# Patient Record
Sex: MALE | Race: ASIAN | NOT HISPANIC OR LATINO | URBAN - METROPOLITAN AREA
[De-identification: names, ages, dates, MRNs, and addresses within clinical notes are randomized per-mention and may not be internally consistent; named-entity substitution may affect disease eponyms.]

---

## 2023-06-14 VITALS
WEIGHT: 160.06 LBS | HEIGHT: 67 IN | DIASTOLIC BLOOD PRESSURE: 71 MMHG | RESPIRATION RATE: 16 BRPM | SYSTOLIC BLOOD PRESSURE: 139 MMHG | TEMPERATURE: 98 F | HEART RATE: 54 BPM | OXYGEN SATURATION: 96 %

## 2023-06-14 RX ORDER — CHLORHEXIDINE GLUCONATE 213 G/1000ML
1 SOLUTION TOPICAL ONCE
Refills: 0 | Status: DISCONTINUED | OUTPATIENT
Start: 2023-06-19 | End: 2023-06-20

## 2023-06-14 NOTE — H&P ADULT - RESPIRATORY
Bed: 11  Expected date:   Expected time:   Means of arrival:   Comments:  ems     Mason Bo RN  08/03/19 7629 normal/clear to auscultation bilaterally/no wheezes/no rales/no rhonchi

## 2023-06-14 NOTE — H&P ADULT - NSHPLABSRESULTS_GEN_ALL_CORE
15.1   7.14  )-----------( 251      ( 19 Jun 2023 07:09 )             45.3                             EKG: SB @ 53 BPM, incomplete RBBB, no ST/T segment changes

## 2023-06-14 NOTE — H&P ADULT - HISTORY OF PRESENT ILLNESS
Cardio: Dr. Aruna Crow  Pharmacy:  Escort:    57 year old male current smoker with PMHX of HTN, HLD. Pt presented to their outpatient cardiologist Dr. Aruna Crow complaining of SSCP w/ WHITE upon climbing mild incline. Pt currently feels _____.  Denies CP/SOB, dizziness, palpitations, orthopnea/PND, leg swelling, LOC, bleeding, melena/hematochezia, fever, chills, URI symptoms, or recent illness.    Patient underwent ECHO with____, Patient underwent NST/Stress-Echo with  ________       In light of pts risk factors, CCS class III anginal symptoms and abnormal NST, pt now presents to [facility] for recommended cardiac catheterization with possible intervention if clinically indicated.     Cardio: Dr. Aruna Crow  Pharmacy:  Escort:    57 year old male current smoker with PMHX of HTN, HLD, TIA (on aspirin, statin). Pt presented to their outpatient cardiologist Dr. Aruna Crow complaining of SSCP w/ WHITE upon climbing mild incline. Underwent abnormal CCTA (see report below). Pt currently feels _____.  Denies CP/SOB, dizziness, palpitations, orthopnea/PND, leg swelling, LOC, bleeding, melena/hematochezia, fever, chills, URI symptoms, or recent illness.    CCTA 5/1/23: Ca score 103, severe mixed disease in the mLCx, severe mixed disease in the mid RCA, mild-mod mixed disease in the D1, mod mixed in the ramus intermedius, which is a small caliber vessel, remaining coronary artery segments appear non-obstructive.     In light of pts risk factors, CCS class III anginal symptoms and abnormal CCTA, pt now presents to [facility] for recommended cardiac catheterization with possible intervention if clinically indicated.     Cardio: Dr. Aruna Crow  Pharmacy:  Escort:    56 yo M, current smoker (45 pack years), PMHx HTN, HLD, TIA (on aspirin, statin) who presented to their outpatient cardiologist Dr. Aruna Crow complaining of SSCP w/ WHITE upon climbing mild incline. CCTA 5/1/23: Ca score 103, severe mixed disease in the mLCx, severe mixed disease in the mid RCA, mild-mod mixed disease in the D1, mod mixed in the ramus intermedius, which is a small caliber vessel, remaining coronary artery segments appear non-obstructive. Denies f/c, fatigue, syncope. In light of pt's risk factors, CCS class III anginal symptoms, abnormal CCTA, pt presents for recommended cardiac catheterization with possible intervention if clinically indicated.     Cardio: Dr. Aruna Crow  Pharmacy: [address] [place] [address] Pharmacy [id number]  Escort: Family    56 yo M, current smoker (45 pack years), PMHx HTN, HLD, TIA (on aspirin, statin) who presented to their outpatient cardiologist Dr. Aruna Crow complaining of SSCP w/ WHITE upon climbing mild incline. CCTA 5/1/23: Ca score 103, severe mixed disease in the mLCx, severe mixed disease in the mid RCA, mild-mod mixed disease in the D1, mod mixed in the ramus intermedius, which is a small caliber vessel, remaining coronary artery segments appear non-obstructive. Denies f/c, fatigue, syncope. In light of pt's risk factors, CCS class III anginal symptoms, abnormal CCTA, pt presents for recommended cardiac catheterization with possible intervention if clinically indicated.

## 2023-06-14 NOTE — H&P ADULT - SOCIAL HISTORY
Called and talked to patient she gas had from Wednesday sinus drainage with cough denies fever but has chills. Taking Ramona seltzer cold and flu. Unable to drive and currently has no transport. Her daughter just recovered from being ill. She is asking if Dr Horacio Trivedi would send her an antibiotic to the sinus infection. Yes

## 2023-06-16 RX ORDER — LOSARTAN POTASSIUM 100 MG/1
1 TABLET, FILM COATED ORAL
Refills: 0 | DISCHARGE

## 2023-06-19 ENCOUNTER — INPATIENT (INPATIENT)
Facility: HOSPITAL | Age: 58
LOS: 0 days | Discharge: HOME | DRG: 247 | End: 2023-06-20
Attending: INTERNAL MEDICINE | Admitting: INTERNAL MEDICINE
Payer: COMMERCIAL

## 2023-06-19 LAB
A1C WITH ESTIMATED AVERAGE GLUCOSE RESULT: 6.2 % — HIGH (ref 4–5.6)
ALBUMIN SERPL ELPH-MCNC: 4 G/DL — SIGNIFICANT CHANGE UP (ref 3.3–5)
ALP SERPL-CCNC: 76 U/L — SIGNIFICANT CHANGE UP (ref 40–120)
ALT FLD-CCNC: 25 U/L — SIGNIFICANT CHANGE UP (ref 10–45)
ANION GAP SERPL CALC-SCNC: 12 MMOL/L — SIGNIFICANT CHANGE UP (ref 5–17)
APTT BLD: 31.7 SEC — SIGNIFICANT CHANGE UP (ref 27.5–35.5)
AST SERPL-CCNC: 19 U/L — SIGNIFICANT CHANGE UP (ref 10–40)
BASOPHILS # BLD AUTO: 0.08 K/UL — SIGNIFICANT CHANGE UP (ref 0–0.2)
BASOPHILS NFR BLD AUTO: 1.1 % — SIGNIFICANT CHANGE UP (ref 0–2)
BILIRUB SERPL-MCNC: 0.4 MG/DL — SIGNIFICANT CHANGE UP (ref 0.2–1.2)
BLOOD GAS VENOUS - BLOOD UREA NITROGEN: 28 MG/DL — HIGH (ref 7–23)
BLOOD GAS VENOUS - CREATININE: 1.3 MG/DL — SIGNIFICANT CHANGE UP (ref 0.5–1.3)
BUN SERPL-MCNC: 27 MG/DL — HIGH (ref 7–23)
CA-I SERPL-SCNC: 1.24 MMOL/L — SIGNIFICANT CHANGE UP (ref 1.15–1.33)
CALCIUM SERPL-MCNC: 9.2 MG/DL — SIGNIFICANT CHANGE UP (ref 8.4–10.5)
CHLORIDE BLDV-SCNC: 105 MMOL/L — SIGNIFICANT CHANGE UP (ref 96–108)
CHLORIDE SERPL-SCNC: 104 MMOL/L — SIGNIFICANT CHANGE UP (ref 96–108)
CHOLEST SERPL-MCNC: 115 MG/DL — SIGNIFICANT CHANGE UP
CK MB CFR SERPL CALC: 2.1 NG/ML — SIGNIFICANT CHANGE UP (ref 0–6.7)
CK SERPL-CCNC: 149 U/L — SIGNIFICANT CHANGE UP (ref 30–200)
CO2 BLDV-SCNC: 21.8 MMOL/L — LOW (ref 22–26)
CO2 SERPL-SCNC: 23 MMOL/L — SIGNIFICANT CHANGE UP (ref 22–31)
CREAT SERPL-MCNC: 1.09 MG/DL — SIGNIFICANT CHANGE UP (ref 0.5–1.3)
EGFR: 78 ML/MIN/1.73M2 — SIGNIFICANT CHANGE UP
EOSINOPHIL # BLD AUTO: 0.55 K/UL — HIGH (ref 0–0.5)
EOSINOPHIL NFR BLD AUTO: 7.7 % — HIGH (ref 0–6)
ESTIMATED AVERAGE GLUCOSE: 131 MG/DL — HIGH (ref 68–114)
GAS PNL BLDV: 134 MMOL/L — LOW (ref 136–145)
GLUCOSE BLDV-MCNC: 116 MG/DL — HIGH (ref 70–99)
GLUCOSE SERPL-MCNC: 122 MG/DL — HIGH (ref 70–99)
HCT VFR BLD CALC: 45.3 % — SIGNIFICANT CHANGE UP (ref 39–50)
HCT VFR BLDA CALC: 46 % — SIGNIFICANT CHANGE UP
HDLC SERPL-MCNC: 31 MG/DL — LOW
HGB BLD-MCNC: 15.1 G/DL — SIGNIFICANT CHANGE UP (ref 13–17)
IMM GRANULOCYTES NFR BLD AUTO: 0.6 % — SIGNIFICANT CHANGE UP (ref 0–0.9)
INR BLD: 0.93 — SIGNIFICANT CHANGE UP (ref 0.88–1.16)
ISTAT ACTK (ACTIVATED CLOTTING TIME KAOLIN): 317 SEC — HIGH (ref 74–137)
ISTAT INR: 1.1 — SIGNIFICANT CHANGE UP (ref 0.88–1.16)
ISTAT PT: 12.6 SEC — SIGNIFICANT CHANGE UP (ref 10–12.9)
LACTATE BLDV-MCNC: 1.1 MMOL/L — SIGNIFICANT CHANGE UP (ref 0.5–2)
LIPID PNL WITH DIRECT LDL SERPL: 46 MG/DL — SIGNIFICANT CHANGE UP
LYMPHOCYTES # BLD AUTO: 2.58 K/UL — SIGNIFICANT CHANGE UP (ref 1–3.3)
LYMPHOCYTES # BLD AUTO: 36.1 % — SIGNIFICANT CHANGE UP (ref 13–44)
MAGNESIUM SERPL-MCNC: 2 MG/DL — SIGNIFICANT CHANGE UP (ref 1.6–2.6)
MCHC RBC-ENTMCNC: 30.2 PG — SIGNIFICANT CHANGE UP (ref 27–34)
MCHC RBC-ENTMCNC: 33.3 GM/DL — SIGNIFICANT CHANGE UP (ref 32–36)
MCV RBC AUTO: 90.6 FL — SIGNIFICANT CHANGE UP (ref 80–100)
MONOCYTES # BLD AUTO: 0.74 K/UL — SIGNIFICANT CHANGE UP (ref 0–0.9)
MONOCYTES NFR BLD AUTO: 10.4 % — SIGNIFICANT CHANGE UP (ref 2–14)
NEUTROPHILS # BLD AUTO: 3.15 K/UL — SIGNIFICANT CHANGE UP (ref 1.8–7.4)
NEUTROPHILS NFR BLD AUTO: 44.1 % — SIGNIFICANT CHANGE UP (ref 43–77)
NON HDL CHOLESTEROL: 84 MG/DL — SIGNIFICANT CHANGE UP
NRBC # BLD: 0 /100 WBCS — SIGNIFICANT CHANGE UP (ref 0–0)
PCO2 BLDV: 43 MMHG — SIGNIFICANT CHANGE UP (ref 42–55)
PH BLDV: 7.37 — SIGNIFICANT CHANGE UP (ref 7.32–7.43)
PLATELET # BLD AUTO: 251 K/UL — SIGNIFICANT CHANGE UP (ref 150–400)
POTASSIUM BLDV-SCNC: 3.8 MMOL/L — SIGNIFICANT CHANGE UP (ref 3.5–5.1)
POTASSIUM SERPL-MCNC: 3.6 MMOL/L — SIGNIFICANT CHANGE UP (ref 3.5–5.3)
POTASSIUM SERPL-SCNC: 3.6 MMOL/L — SIGNIFICANT CHANGE UP (ref 3.5–5.3)
PROT SERPL-MCNC: 7.3 G/DL — SIGNIFICANT CHANGE UP (ref 6–8.3)
PROTHROM AB SERPL-ACNC: 11.1 SEC — SIGNIFICANT CHANGE UP (ref 10.5–13.4)
RBC # BLD: 5 M/UL — SIGNIFICANT CHANGE UP (ref 4.2–5.8)
RBC # FLD: 13.6 % — SIGNIFICANT CHANGE UP (ref 10.3–14.5)
SODIUM SERPL-SCNC: 139 MMOL/L — SIGNIFICANT CHANGE UP (ref 135–145)
TRIGL SERPL-MCNC: 191 MG/DL — HIGH
WBC # BLD: 7.14 K/UL — SIGNIFICANT CHANGE UP (ref 3.8–10.5)
WBC # FLD AUTO: 7.14 K/UL — SIGNIFICANT CHANGE UP (ref 3.8–10.5)

## 2023-06-19 PROCEDURE — 93010 ELECTROCARDIOGRAM REPORT: CPT

## 2023-06-19 RX ORDER — ATORVASTATIN CALCIUM 80 MG/1
80 TABLET, FILM COATED ORAL AT BEDTIME
Refills: 0 | Status: DISCONTINUED | OUTPATIENT
Start: 2023-06-19 | End: 2023-06-20

## 2023-06-19 RX ORDER — SODIUM CHLORIDE 9 MG/ML
500 INJECTION INTRAMUSCULAR; INTRAVENOUS; SUBCUTANEOUS ONCE
Refills: 0 | Status: COMPLETED | OUTPATIENT
Start: 2023-06-19 | End: 2023-06-19

## 2023-06-19 RX ORDER — ASPIRIN/CALCIUM CARB/MAGNESIUM 324 MG
81 TABLET ORAL DAILY
Refills: 0 | Status: DISCONTINUED | OUTPATIENT
Start: 2023-06-20 | End: 2023-06-20

## 2023-06-19 RX ORDER — CLOPIDOGREL BISULFATE 75 MG/1
600 TABLET, FILM COATED ORAL ONCE
Refills: 0 | Status: COMPLETED | OUTPATIENT
Start: 2023-06-19 | End: 2023-06-19

## 2023-06-19 RX ORDER — POTASSIUM CHLORIDE 20 MEQ
40 PACKET (EA) ORAL ONCE
Refills: 0 | Status: COMPLETED | OUTPATIENT
Start: 2023-06-19 | End: 2023-06-19

## 2023-06-19 RX ORDER — SODIUM CHLORIDE 9 MG/ML
500 INJECTION INTRAMUSCULAR; INTRAVENOUS; SUBCUTANEOUS
Refills: 0 | Status: DISCONTINUED | OUTPATIENT
Start: 2023-06-19 | End: 2023-06-20

## 2023-06-19 RX ORDER — CLOPIDOGREL BISULFATE 75 MG/1
75 TABLET, FILM COATED ORAL DAILY
Refills: 0 | Status: DISCONTINUED | OUTPATIENT
Start: 2023-06-20 | End: 2023-06-20

## 2023-06-19 RX ORDER — AMLODIPINE BESYLATE 2.5 MG/1
5 TABLET ORAL DAILY
Refills: 0 | Status: DISCONTINUED | OUTPATIENT
Start: 2023-06-20 | End: 2023-06-20

## 2023-06-19 RX ORDER — LOSARTAN POTASSIUM 100 MG/1
100 TABLET, FILM COATED ORAL DAILY
Refills: 0 | Status: DISCONTINUED | OUTPATIENT
Start: 2023-06-20 | End: 2023-06-20

## 2023-06-19 RX ORDER — SODIUM CHLORIDE 9 MG/ML
500 INJECTION INTRAMUSCULAR; INTRAVENOUS; SUBCUTANEOUS
Refills: 0 | Status: DISCONTINUED | OUTPATIENT
Start: 2023-06-19 | End: 2023-06-19

## 2023-06-19 RX ORDER — METOPROLOL TARTRATE 50 MG
1 TABLET ORAL
Refills: 0 | DISCHARGE

## 2023-06-19 RX ADMIN — ATORVASTATIN CALCIUM 80 MILLIGRAM(S): 80 TABLET, FILM COATED ORAL at 21:39

## 2023-06-19 RX ADMIN — SODIUM CHLORIDE 215 MILLILITER(S): 9 INJECTION INTRAMUSCULAR; INTRAVENOUS; SUBCUTANEOUS at 10:21

## 2023-06-19 RX ADMIN — SODIUM CHLORIDE 1000 MILLILITER(S): 9 INJECTION INTRAMUSCULAR; INTRAVENOUS; SUBCUTANEOUS at 08:18

## 2023-06-19 RX ADMIN — CLOPIDOGREL BISULFATE 600 MILLIGRAM(S): 75 TABLET, FILM COATED ORAL at 08:20

## 2023-06-19 RX ADMIN — Medication 40 MILLIEQUIVALENT(S): at 08:27

## 2023-06-19 RX ADMIN — SODIUM CHLORIDE 75 MILLILITER(S): 9 INJECTION INTRAMUSCULAR; INTRAVENOUS; SUBCUTANEOUS at 08:25

## 2023-06-19 NOTE — PATIENT PROFILE ADULT - SURGICAL SITE DRAIN
----- Message from Tarsha Ibarra sent at 12/24/2019  7:47 AM CST -----  Contact: pt  Can the clinic reply in [facility]: no    Please refill the medication(s) listed below. The patient can be reached at this phone number [phone]) once it is called into the pharmacy.    Medication #1oxyCODONE (ROXICODONE) 15 MG Tab    Preferred Pharmacy:[facility] - [address]   no

## 2023-06-19 NOTE — PATIENT PROFILE ADULT - FALL HARM RISK - HARM RISK INTERVENTIONS

## 2023-06-19 NOTE — PATIENT PROFILE ADULT - FALL HARM RISK - FACTORS
Spoke to patient about applying vashe with gauze twice per day. Patient requested more supplies of caramax pads and incontinence pads     Request more caramax pads and incontinence pads on thursday     Post procedure

## 2023-06-19 NOTE — PATIENT PROFILE ADULT - FUNCTIONAL ASSESSMENT - BASIC MOBILITY SCORE.
PAST SURGICAL HISTORY:  H/O hernia repair     S/P appendectomy 12/26/2020    S/P laser trabeculoplasty of eye      24

## 2023-06-20 ENCOUNTER — TRANSCRIPTION ENCOUNTER (OUTPATIENT)
Age: 58
End: 2023-06-20

## 2023-06-20 VITALS — TEMPERATURE: 97 F

## 2023-06-20 LAB
ANION GAP SERPL CALC-SCNC: 13 MMOL/L — SIGNIFICANT CHANGE UP (ref 5–17)
BUN SERPL-MCNC: 22 MG/DL — SIGNIFICANT CHANGE UP (ref 7–23)
CALCIUM SERPL-MCNC: 8.8 MG/DL — SIGNIFICANT CHANGE UP (ref 8.4–10.5)
CHLORIDE SERPL-SCNC: 106 MMOL/L — SIGNIFICANT CHANGE UP (ref 96–108)
CO2 SERPL-SCNC: 20 MMOL/L — LOW (ref 22–31)
CREAT SERPL-MCNC: 1.15 MG/DL — SIGNIFICANT CHANGE UP (ref 0.5–1.3)
EGFR: 73 ML/MIN/1.73M2 — SIGNIFICANT CHANGE UP
GLUCOSE SERPL-MCNC: 123 MG/DL — HIGH (ref 70–99)
HCT VFR BLD CALC: 47.5 % — SIGNIFICANT CHANGE UP (ref 39–50)
HCV AB S/CO SERPL IA: 0.13 S/CO — SIGNIFICANT CHANGE UP
HCV AB SERPL-IMP: SIGNIFICANT CHANGE UP
HGB BLD-MCNC: 15.7 G/DL — SIGNIFICANT CHANGE UP (ref 13–17)
MAGNESIUM SERPL-MCNC: 2.1 MG/DL — SIGNIFICANT CHANGE UP (ref 1.6–2.6)
MCHC RBC-ENTMCNC: 30.3 PG — SIGNIFICANT CHANGE UP (ref 27–34)
MCHC RBC-ENTMCNC: 33.1 GM/DL — SIGNIFICANT CHANGE UP (ref 32–36)
MCV RBC AUTO: 91.7 FL — SIGNIFICANT CHANGE UP (ref 80–100)
NRBC # BLD: 0 /100 WBCS — SIGNIFICANT CHANGE UP (ref 0–0)
PLATELET # BLD AUTO: 232 K/UL — SIGNIFICANT CHANGE UP (ref 150–400)
POTASSIUM SERPL-MCNC: 3.7 MMOL/L — SIGNIFICANT CHANGE UP (ref 3.5–5.3)
POTASSIUM SERPL-SCNC: 3.7 MMOL/L — SIGNIFICANT CHANGE UP (ref 3.5–5.3)
RBC # BLD: 5.18 M/UL — SIGNIFICANT CHANGE UP (ref 4.2–5.8)
RBC # FLD: 13.8 % — SIGNIFICANT CHANGE UP (ref 10.3–14.5)
SODIUM SERPL-SCNC: 139 MMOL/L — SIGNIFICANT CHANGE UP (ref 135–145)
WBC # BLD: 6.88 K/UL — SIGNIFICANT CHANGE UP (ref 3.8–10.5)
WBC # FLD AUTO: 6.88 K/UL — SIGNIFICANT CHANGE UP (ref 3.8–10.5)

## 2023-06-20 PROCEDURE — 82550 ASSAY OF CK (CPK): CPT

## 2023-06-20 PROCEDURE — 83036 HEMOGLOBIN GLYCOSYLATED A1C: CPT

## 2023-06-20 PROCEDURE — 99239 HOSP IP/OBS DSCHRG MGMT >30: CPT

## 2023-06-20 PROCEDURE — 85730 THROMBOPLASTIN TIME PARTIAL: CPT

## 2023-06-20 PROCEDURE — 36415 COLL VENOUS BLD VENIPUNCTURE: CPT

## 2023-06-20 PROCEDURE — C1769: CPT

## 2023-06-20 PROCEDURE — C1887: CPT

## 2023-06-20 PROCEDURE — 85610 PROTHROMBIN TIME: CPT

## 2023-06-20 PROCEDURE — 93010 ELECTROCARDIOGRAM REPORT: CPT

## 2023-06-20 PROCEDURE — 93005 ELECTROCARDIOGRAM TRACING: CPT

## 2023-06-20 PROCEDURE — 80053 COMPREHEN METABOLIC PANEL: CPT

## 2023-06-20 PROCEDURE — 86803 HEPATITIS C AB TEST: CPT

## 2023-06-20 PROCEDURE — C1725: CPT

## 2023-06-20 PROCEDURE — 85347 COAGULATION TIME ACTIVATED: CPT

## 2023-06-20 PROCEDURE — 80061 LIPID PANEL: CPT

## 2023-06-20 PROCEDURE — 80048 BASIC METABOLIC PNL TOTAL CA: CPT

## 2023-06-20 PROCEDURE — C1894: CPT

## 2023-06-20 PROCEDURE — 83735 ASSAY OF MAGNESIUM: CPT

## 2023-06-20 PROCEDURE — 85027 COMPLETE CBC AUTOMATED: CPT

## 2023-06-20 PROCEDURE — 85025 COMPLETE CBC W/AUTO DIFF WBC: CPT

## 2023-06-20 PROCEDURE — 82553 CREATINE MB FRACTION: CPT

## 2023-06-20 PROCEDURE — C1874: CPT

## 2023-06-20 RX ORDER — ATORVASTATIN CALCIUM 80 MG/1
1 TABLET, FILM COATED ORAL
Qty: 30 | Refills: 3
Start: 2023-06-20 | End: 2023-10-17

## 2023-06-20 RX ORDER — ATORVASTATIN CALCIUM 80 MG/1
1 TABLET, FILM COATED ORAL
Refills: 0 | DISCHARGE

## 2023-06-20 RX ORDER — ASPIRIN/CALCIUM CARB/MAGNESIUM 324 MG
1 TABLET ORAL
Refills: 0 | DISCHARGE

## 2023-06-20 RX ORDER — CLOPIDOGREL BISULFATE 75 MG/1
1 TABLET, FILM COATED ORAL
Qty: 30 | Refills: 11
Start: 2023-06-20 | End: 2024-06-13

## 2023-06-20 RX ORDER — ASPIRIN/CALCIUM CARB/MAGNESIUM 324 MG
1 TABLET ORAL
Qty: 30 | Refills: 11
Start: 2023-06-20 | End: 2024-06-13

## 2023-06-20 RX ORDER — POTASSIUM CHLORIDE 20 MEQ
40 PACKET (EA) ORAL ONCE
Refills: 0 | Status: COMPLETED | OUTPATIENT
Start: 2023-06-20 | End: 2023-06-20

## 2023-06-20 RX ADMIN — Medication 81 MILLIGRAM(S): at 10:27

## 2023-06-20 RX ADMIN — Medication 40 MILLIEQUIVALENT(S): at 10:25

## 2023-06-20 RX ADMIN — CLOPIDOGREL BISULFATE 75 MILLIGRAM(S): 75 TABLET, FILM COATED ORAL at 10:27

## 2023-06-20 RX ADMIN — AMLODIPINE BESYLATE 5 MILLIGRAM(S): 2.5 TABLET ORAL at 05:19

## 2023-06-20 NOTE — DISCHARGE NOTE NURSING/CASE MANAGEMENT/SOCIAL WORK - PATIENT PORTAL LINK FT
You can access the FollowMyHealth Patient Portal offered by [facility] by registering at the following website: [email or web address]. By joining [facility]’s FollowMyHealth portal, you will also be able to view your health information using other applications (apps) compatible with our system.

## 2023-06-20 NOTE — DISCHARGE NOTE PROVIDER - NSDCCPCAREPLAN_GEN_ALL_CORE_FT
PRINCIPAL DISCHARGE DIAGNOSIS  Diagnosis: CAD (coronary artery disease)  Assessment and Plan of Treatment: -You underwent a cardiac catheterization on 6/19/2023 and the blockage in your RIGHT CORONARY ARTERY was opened with stent placement. You are to take ASPIRIN 81 mg daily and PLAVIX (CLOPIDOGREL) 75 mg daily. These medications work to keep your stents open.  NEVER MISS A DOSE OF ASPIRIN OR PLAVIX; IF YOU DO, YOU ARE AT RISK OF YOUR STENT CLOSING AND HAVING A HEART ATTACK. DO NOT STOP THESE TWO MEDICATIONS UNLESS INSTRUCTED TO DO SO BY YOUR CARDIOLOGIST.  Your procedure was done through your wrist. You do not need to keep this area covered and you may shower. Please avoid any heavy lifting  (no more than 3 to 5 lbs) or strenuous activity for five days. If you develop any swelling, bleeding, hardening of the skin (hematoma formation), acute pain, numbness/tingling  in your armplease contact your doctor immediately or call our 24/7 line: [phone] Please return to the hospital/seek immediate medical attention if worsening of symptoms- including not limited to chest pain, shortness of breath. Please follow up with Dr. Shirley in 1-2 weeks. Please call his office and make an appointment to see him. Please continue a heart healthy diet low in sodium, cholesterol, and fat.  We have provided you with a prescription for cardiac rehab which is medically supervised exercise program for your heart and has been shown to improve the quantity and quality of life of people with heart disease like yours. You should attend cardiac rehab 3 times per week for 12 weeks. We have provided you with a list of nearby facilities. Please call your insurance carrier to determine which of these facilities are covered under your plan.        SECONDARY DISCHARGE DIAGNOSES  Diagnosis: Hyperlipidemia  Assessment and Plan of Treatment: Too much cholesterol in your arteries may lead to a buildup of plaque known as atherosclerosis and contribute to heart disease. Please continue:ATORVASTATIN (LIPITOR) 80 mg daily.   Appropriate refills were sent to your preferred pharmacy. Please follow-up with your cardiologist for further management.      Diagnosis: Hypertension  Assessment and Plan of Treatment: You have a diagnosis of Hypertension or elevated blood pressure. Please continue taking your medications as listed to keep your blood pressure controlled. In addition, there are multiple lifestyle modifications that have been proven to lower blood pressure: maintaining a healthy body weight, engaging in regular physical activity for at least 30 minutes per day on most days, and consuming a diet rich in fruits, vegetables, and low-fat dairy products with a reduced amount of total and saturated fats and sodium. Please continue your COZAAR 100 mg daily, your AMLODIPINE 5 mg daily, and your hydrochlorothiazide 25 mg daily.   For blood pressures at home that are too high or low please see your Doctor or go to the Emergency Room as necessary.

## 2023-06-20 NOTE — DISCHARGE NOTE PROVIDER - NSDCFUADDINST_GEN_ALL_CORE_FT
ACTIVITY:     Do not drive or operate hazardous machinery for 24 hours.                        Limit your physical activities for 24 hours.                        Do not engage in in sports, heavy work or heavy lifting for 72 hours.    DIET:             You may resume your regular diet.                        Drinking extra fluids (water, juice) is encouraged.                        Abstain from alcohol for 24 hours.    HYGIENE:     Shower and take off the puncture site dressing the next morning.                        If you received a "closure device" in your groin, you may shower the next day, but not take a bath, hot tub or swim for 5    days.    PAIN MEDICATION:   A mild pain at the puncture siteis not unusual.                                        You may take Tylenol 1-2 tabs every 4-6 hours as needed, for one day.                                        If the pain persists contact the office at [phone]    SPECIAL INSTRUCTIONS:  Signs and symptoms to look out for:       1. Shaka bleeding from the puncture site is an emergency.            Put direct pressure on the site and go directly to your local Emergency   Room for treatment.       2. Bleeding under the skin may also occur and a small "black and blue may be expected.         If there appears to be an expanding mass or swelling around the puncture site, apply manual compression and go          immediately to your local Emergency Room for treatment.       3. If your foot/leg or hand/arm (puncture site) becomes cool or blue and/or you are unable to move it, this must be treated as an   emergency.         go directly to your local emergency room for treatment.       4. Excessive puncture site pain is abnormal and should be assessed.      5.  Look out for signs of infection in the puncture site: fever, red streaking of the leg, discharge, pain.      6.  Lack of adequate urine output, provided you are drinking enough fluids, may be cause for concern, notify us if you think this is the case.

## 2023-06-20 NOTE — DISCHARGE NOTE PROVIDER - HOSPITAL COURSE
58 yo M, current smoker (45 pack years), PMHx HTN, HLD, TIA (on aspirin, statin) who presented to their outpatient cardiologist Dr. Aruna Crow complaining of SSCP w/ WHITE upon climbing mild incline. CCTA 5/1/23: Ca score 103, severe mixed disease in the mLCx, severe mixed disease in the mid RCA, mild-mod mixed disease in the D1, mod mixed in the ramus intermedius, which is a small caliber vessel, remaining coronary artery segments appear non-obstructive. Denies f/c, fatigue, syncope. In light of pt's risk factors, CCS class III anginal symptoms, abnormal CCTA, pt presents for recommended cardiac catheterization with possible intervention if clinically indicated.  Patient is s/p cardiac cath 6/20/23: PTCA/KRUPA x 1 (Synergy 32 x 3 mm) mid RCA, and other findings LM normal, mid LAD 30%, D1 50-60%, LCx/OM1 bifurcation 80%, proximal RCA 30%, distal RCA 30%, EDP 12 mmHg. Right radial Vasc x 2 at 11:30.  D/C meds: Aspirin 81 mg daily, Plavix 75 mg daily, Atorvastatin 80 mg daily, Losartan 100 mg daily, Hydrochlorothiazide 25 mg daily, and amlodipine 5 mg daily   No significant events on telemetry overnight. Repeat EKG without ischemic changes. Patient has been medically cleared for discharge as per Dr. Aviles. Patient has been given appropriate discharge instructions including medication regimen, access site management and follow up. Medications that patient needs refills on have been e-prescribed to preferred pharmacy.     Cardiac Rehab (Post PCI):            *Education on benefits of Cardiac Rehab provided to patient: Yes         *Referral and Prescription Given for Cardiac Rehab : Yes         *Pt given list of locations & instructed to contact their insurance company to review list of participating providers

## 2023-06-20 NOTE — DISCHARGE NOTE PROVIDER - CARE PROVIDER_API CALL
Kenneth Shirley  Cardiology  [address], [address] [place] [address]  Phone: [phone]  Fax: [phone]  Follow Up Time: 1 week

## 2023-06-20 NOTE — DISCHARGE NOTE PROVIDER - NSDCMRMEDTOKEN_GEN_ALL_CORE_FT
amLODIPine 5 mg oral tablet: 1 orally once a day  Aspirin Enteric Coated 81 mg oral delayed release tablet: 1 tab(s) orally once a day  atorvastatin 80 mg oral tablet: 1 tab(s) orally once a day (at bedtime)  Cardiac Rehab: Cardiac Rehab 3 x week for 12 weeks for post PCI; Please follow-up with Dr. Shirley  clopidogrel 75 mg oral tablet: 1 tab(s) orally once a day  Cozaar 100 mg oral tablet: 1 orally once a day  hydroCHLOROthiazide 25 mg oral tablet: 1 orally once a day  Ilumya 100 mg/mL subcutaneous solution: 1 dose(s) subcutaneously every 12 weeks

## 2023-06-23 DIAGNOSIS — I10 ESSENTIAL (PRIMARY) HYPERTENSION: ICD-10-CM

## 2023-06-23 DIAGNOSIS — E78.5 HYPERLIPIDEMIA, UNSPECIFIED: ICD-10-CM

## 2023-06-23 DIAGNOSIS — I25.119 ATHEROSCLEROTIC HEART DISEASE OF NATIVE CORONARY ARTERY WITH UNSPECIFIED ANGINA PECTORIS: ICD-10-CM

## 2023-06-23 DIAGNOSIS — Z86.73 PERSONAL HISTORY OF TRANSIENT ISCHEMIC ATTACK (TIA), AND CEREBRAL INFARCTION WITHOUT RESIDUAL DEFICITS: ICD-10-CM

## 2023-06-23 DIAGNOSIS — Z79.82 LONG TERM (CURRENT) USE OF ASPIRIN: ICD-10-CM

## 2023-06-23 DIAGNOSIS — F17.210 NICOTINE DEPENDENCE, CIGARETTES, UNCOMPLICATED: ICD-10-CM

## 2023-07-27 VITALS
RESPIRATION RATE: 16 BRPM | TEMPERATURE: 97 F | WEIGHT: 160.06 LBS | OXYGEN SATURATION: 97 % | HEIGHT: 67 IN | HEART RATE: 52 BPM | DIASTOLIC BLOOD PRESSURE: 80 MMHG | SYSTOLIC BLOOD PRESSURE: 136 MMHG

## 2023-07-27 PROBLEM — I10 ESSENTIAL (PRIMARY) HYPERTENSION: Chronic | Status: ACTIVE | Noted: 2023-06-14

## 2023-07-27 PROBLEM — E78.5 HYPERLIPIDEMIA, UNSPECIFIED: Chronic | Status: ACTIVE | Noted: 2023-06-14

## 2023-07-27 RX ORDER — CHLORHEXIDINE GLUCONATE 213 G/1000ML
1 SOLUTION TOPICAL ONCE
Refills: 0 | Status: DISCONTINUED | OUTPATIENT
Start: 2023-07-31 | End: 2023-08-01

## 2023-07-27 NOTE — H&P ADULT - ASSESSMENT
57-year-old male, current smoker (45 pack years), PMHx HTN, HLD, CAD (s/p recent PCI 6/20/23: PTCA/KRUPA x1 mRCA; LCx/OM1 bifurcation 80%), TIA (on aspirin, statin) who is referred to [facility] for staged PCI of the LCx/OM1 in light of patient's risk factors, continued CCS III anginal-equivalent symptoms and known residual LCx/OM1 disease.    ASA II				Mallampati class: II	            Anginal Class: III    - VS s/f HR 52 bpm; patient is asymptomatic, VS otherwise stable   - H/H stable, patient denies BRBPR, melena, hematuria, or further bleeding.   -  Sedation Plan: Moderate   - Patient Is Suitable Candidate For Sedation? Yes  - Cath Order Entered: Yes  - DAPT LOAD: Patient takes ASA 81 mg and Plavix 75 mg daily and endorses good adherence, denies missed doses in last week. Ordered ASA 81 mg PO x1 and Plavix 75 mg PO x1.   - PRE-CATH FLUIDS: Yes; patient is euvolemic on exam; EF 55-60% (April 2023); Cr 1.7. Dr. Shirley aware and agrees with plan for pre-cath IV fluid hydration protocol and to proceed with cath. Ordered IV  cc bolus x 1 over 30 minutes followed by IV NS 75 cc/hr x 2 hours.   - ALLERGY: Patient notes crab allergy (tongue pruritis); ordered Solucortef 200 mg IVP x1 and Benadryl 50 mg IVP x1 on call to the cath lab.   - Informed consent is in the patient's chart. Consent obtained via Language Line Video  Ana ID #[id number].     Risks Benefits Annotation:     CARDIAC CATH: Risks & benefits of procedure and sedation and risks and benefits for the alternative therapy have been explained to the patient and/or HCP in layman’s terms including but not limited to: allergic reaction, bleeding, infection, arrhythmia, respiratory compromise, renal and vascular compromise, limb damage, MI, CVA, emergent CABG/Vascular Surgery and death. Informed consent obtained and in chart.   57-year-old male, current smoker (45 pack years), PMHx HTN, HLD, CAD (s/p recent PCI 6/20/23: PTCA/KRUPA x1 mRCA; LCx/OM1 bifurcation 80%), TIA (on aspirin, statin) who is referred to [facility] for staged PCI of the LCx/OM1 in light of patient's risk factors, continued CCS III anginal-equivalent symptoms and known residual LCx/OM1 disease.    ASA II				Mallampati class: II	            Anginal Class: III    - VS s/f HR 52 bpm; patient is asymptomatic, VS otherwise stable   - H/H stable, patient denies BRBPR, melena, hematuria, or further bleeding.   -  Sedation Plan: Moderate   - Patient Is Suitable Candidate For Sedation? Yes  - Cath Order Entered: Yes  - DAPT LOAD: Patient takes ASA 81 mg and Plavix 75 mg daily and endorses good adherence, denies missed doses in last week. Ordered ASA 81 mg PO x1 and Plavix 75 mg PO x1.   - PRE-CATH FLUIDS: Yes; patient is euvolemic on exam; EF 55-60% (April 2023); Cr 1.7. Dr. Shirley aware and agrees with plan for pre-cath IV fluid hydration protocol and to proceed with cath. Ordered IV  cc bolus x 1 over 30 minutes followed by IV NS 75 cc/hr x 2 hours.   - ALLERGY: Patient notes crab allergy (tongue pruritis); ordered Solucortef 200 mg IVP x1 and Benadryl 50 mg IVP x1 on call to the cath lab.   - Informed consent is in the patient's chart. Consent obtained via Language Line Video  Ana Stahl) ID #[id number].     Risks Benefits Annotation:     CARDIAC CATH: Risks & benefits of procedure and sedation and risks and benefits for the alternative therapy have been explained to the patient and/or HCP in layman’s terms including but not limited to: allergic reaction, bleeding, infection, arrhythmia, respiratory compromise, renal and vascular compromise, limb damage, MI, CVA, emergent CABG/Vascular Surgery and death. Informed consent obtained and in chart.   57-year-old male, current smoker (45 pack years), PMHx HTN, HLD, CAD (s/p recent PCI 6/20/23: PTCA/KRUPA x1 mRCA; LCx/OM1 bifurcation 80%), TIA (on aspirin, statin) who is referred to [facility] for staged PCI of the LCx/OM1 in light of patient's risk factors, continued CCS III anginal-equivalent symptoms and known residual LCx/OM1 disease.    ASA II				Mallampati class: II	            Anginal Class: III    - VS s/f HR 52 bpm; patient is asymptomatic, VS otherwise stable   - H/H stable, patient denies BRBPR, melena, hematuria, or further bleeding.   -  Sedation Plan: Moderate   - Patient Is Suitable Candidate For Sedation? Yes  - Cath Order Entered: Yes  - DAPT LOAD: Patient takes ASA 81 mg and Plavix 75 mg daily and endorses good adherence, denies missed doses in last week. Ordered ASA 81 mg PO x1 and Plavix 75 mg PO x1.   - PRE-CATH FLUIDS: Yes; patient is euvolemic on exam; EF 55-60% (April 2023); Cr 1.7. Repeat labs  showed Cr still 1.7.  Has been started on IVF.  Will hold cath for now and obtain renal consult at bedside.  - ALLERGY: Patient notes crab allergy (tongue pruritis); ordered Solucortef 200 mg IVP x1 and Benadryl 50 mg IVP x1 on call to the cath lab.   - Informed consent is in the patient's chart. Consent obtained via Language Line Video  Ana Stahl) ID #[id number].     Risks Benefits Annotation:     CARDIAC CATH: Risks & benefits of procedure and sedation and risks and benefits for the alternative therapy have been explained to the patient and/or HCP in layman’s terms including but not limited to: allergic reaction, bleeding, infection, arrhythmia, respiratory compromise, renal and vascular compromise, limb damage, MI, CVA, emergent CABG/Vascular Surgery and death. Informed consent obtained and in chart.   57-year-old male, current smoker (45 pack years), PMHx HTN, HLD, CAD (s/p recent PCI 6/20/23: PTCA/KRUPA x1 mRCA; LCx/OM1 bifurcation 80%), TIA (on aspirin, statin) who is referred to [facility] for staged PCI of the LCx/OM1 in light of patient's risk factors, continued CCS III anginal-equivalent symptoms and known residual LCx/OM1 disease.    ASA II				Mallampati class: II	            Anginal Class: III    - VS s/f HR 52 bpm; patient is asymptomatic, VS otherwise stable   - H/H stable, patient denies BRBPR, melena, hematuria, or further bleeding.   -  Sedation Plan: Moderate   - Patient Is Suitable Candidate For Sedation? Yes  - Cath Order Entered: Yes  - DAPT LOAD: Patient takes ASA 81 mg and Plavix 75 mg daily and endorses good adherence, denies missed doses in last week. Ordered ASA 81 mg PO x1 and Plavix 75 mg PO x1.   - PRE-CATH FLUIDS: Yes; patient is euvolemic on exam; EF 55-60% (April 2023); Cr 1.7. Repeat labs  showed Cr still 1.7.  Has been started on IVF.  Held cath and obtained renal consult. Nephrology states that patient is cleared to proceed with cath from a renal standpoint. Dr. Shirley (interventional cardiologist) made aware.   - ALLERGY: Patient notes crab allergy (tongue pruritis); ordered Solucortef 200 mg IVP x1 and Benadryl 50 mg IVP x1 on call to the cath lab.   - Informed consent is in the patient's chart. Consent obtained via Language Line Video  Ana Stahl) ID #[id number].     Risks Benefits Annotation:     CARDIAC CATH: Risks & benefits of procedure and sedation and risks and benefits for the alternative therapy have been explained to the patient and/or HCP in layman’s terms including but not limited to: allergic reaction, bleeding, infection, arrhythmia, respiratory compromise, renal and vascular compromise, limb damage, MI, CVA, emergent CABG/Vascular Surgery and death. Informed consent obtained and in chart.

## 2023-07-27 NOTE — H&P ADULT - NSHPLABSRESULTS_GEN_ALL_CORE
LABS:                          15.4   8.09  )-----------( 227      ( 31 Jul 2023 06:54 )             46.8       Mg     1.9     07-31        PT/INR - ( 31 Jul 2023 06:54 )   PT: 10.3 sec;   INR: 0.90          PTT - ( 31 Jul 2023 06:54 )  PTT:34.2 sec LABS:                          15.4   8.09  )-----------( 227      ( 31 Jul 2023 06:54 )             46.8     07-31    142  |  102  |  29<H>  ----------------------------<  106<H>  4.0   |  29  |  1.71<H>    Ca    9.8      31 Jul 2023 06:54  Mg     1.9     07-31    TPro  8.0  /  Alb  4.4  /  TBili  0.4  /  DBili  x   /  AST  19  /  ALT  23  /  AlkPhos  67  07-31    LIVER FUNCTIONS - ( 31 Jul 2023 06:54 )  Alb: 4.4 g/dL / Pro: 8.0 g/dL / ALK PHOS: 67 U/L / ALT: 23 U/L / AST: 19 U/L / GGT: x           PT/INR - ( 31 Jul 2023 06:54 )   PT: 10.3 sec;   INR: 0.90          PTT - ( 31 Jul 2023 06:54 )  PTT:34.2 sec  Urinalysis Basic - ( 31 Jul 2023 06:54 )    Color: x / Appearance: x / SG: x / pH: x  Gluc: 106 mg/dL / Ketone: x  / Bili: x / Urobili: x   Blood: x / Protein: x / Nitrite: x   Leuk Esterase: x / RBC: x / WBC x   Sq Epi: x / Non Sq Epi: x / Bacteria: x      EKG 7/31/23: sinus rhythm, 50 bpm,  ms, no acute changes.

## 2023-07-27 NOTE — H&P ADULT - HISTORY OF PRESENT ILLNESS
Cardio: Dr. Aruna Crow  Pharmacy: [address] [place] [address] Pharmacy [id number]  Escort: ________    56 yo M, current smoker (45 pack years), PMHx HTN, HLD, CAD (s/p recent PCI 6/20/23: PTCA/KRUPA x1 mRCA; LM normal, mid LAD 30%, D1 50-60%, LCx/OM1 bifurcation 80%, proximal RCA 30%, distal RCA 30%), TIA (on aspirin, statin) who originally presented to their outpatient cardiologist Dr. Aruna Crow complaining of SSCP w/ WHITE upon climbing mild incline. CCTA 5/1/23: Ca score 103, severe mixed disease in the mLCx, severe mixed disease in the mid RCA, mild-mod mixed disease in the D1, mod mixed in the ramus intermedius, which is a small caliber vessel, remaining coronary artery segments appear non-obstructive. Denies f/c, fatigue, syncope. In light of pt's risk factors, CCS class III anginal symptoms, abnormal CCTA, pt presents for recommended cardiac catheterization with possible intervention if clinically indicated. Patient underwent [facility] w/ PCI as above, with residual disease in LCx/OM1 bifurcation with plan for staging. Since procedure, patient reports feeling much better, with drastic improvement in his substernal chest pain, but does continue to experience mild SOB with stairs. Patient otherwise denies _________ dizziness, palpitations, orthopnea/PND, leg swelling, LOC, bleeding, melena/hematochezia, fever, chills, URI symptoms, or recent illness.  In light of pts risk factors, continued CCS III anginal-equivalent symptoms and known residual LCx/OM1 disease, pt now presents to [facility] for staged PCI.  Cardio: Dr. Aruna Crow  Pharmacy: [address] [place] [address] Pharmacy [id number]  Escort: ________    57-year-old male, current smoker (45 pack years), PMHx HTN, HLD, CAD (s/p recent PCI 6/20/23: PTCA/KRUPA x1 mRCA; LCx/OM1 bifurcation 80%), TIA (on aspirin, statin) who originally presented to their outpatient cardiologist Dr. Aruna Crow complaining of SSCP w/ WHITE upon climbing mild incline. Patient underwent [facility] w/ PCI on 6/20/23 with residual disease in LCx/OM1 bifurcation with plan for staging. Since procedure, patient reports feeling much better, with drastic improvement in his substernal chest pain, but does continue to experience mild SOB with stairs and _____ endorses strict adherence to DAPT therapy as prescribed. Patient otherwise denies _________ dizziness, palpitations, orthopnea/PND, leg swelling, LOC, bleeding, melena/hematochezia, fever, chills, URI symptoms, or recent illness.  In light of patient's risk factors, continued CCS III anginal-equivalent symptoms and known residual LCx/OM1 disease, pt now presents to [facility] for staged PCI.     Cardiac catheterization (6/20/23): PTCA/KRUPA x 1 (Synergy 32 x 3 mm) mid RCA, and other findings LM normal, mid LAD 30%, D1 50-60%, LCx/OM1 bifurcation 80%, proximal RCA 30%, distal RCA 30%, EDP 12 mmHg. Right radial access.     CCTA (5/1/23): Ca score 103, severe mixed disease in the mLCx, severe mixed disease in the mid RCA, mild-mod mixed disease in the D1, mod mixed in the ramus intermedius, which is a small caliber vessel, remaining coronary artery segments appear non-obstructive. Cardio: Dr. Aruna Crow  Pharmacy: [address] [place] [address] Pharmacy [id number]  Escort: wife    57-year-old male, current smoker (45 pack years), PMHx HTN, HLD, CAD (s/p recent PCI 6/20/23: PTCA/KRUPA x1 mRCA; LCx/OM1 bifurcation 80%), TIA (on aspirin, statin) who originally presented to their outpatient cardiologist Dr. Aruna Crow complaining of SSCP w/ WHITE upon climbing mild incline. Patient underwent [facility] w/ PCI on 6/20/23 with residual disease in LCx/OM1 bifurcation with plan for staging. Since procedure, patient reports feeling much better, with drastic improvement in his substernal chest pain, but does continue to experience mild SOB with stairs and endorses strict adherence to DAPT therapy as prescribed. Patient denies: dizziness, palpitations, orthopnea/PND, leg swelling, LOC, bleeding, melena/hematochezia, fever, chills, URI symptoms, or recent illness.  In light of patient's risk factors, continued CCS III anginal-equivalent symptoms and known residual LCx/OM1 disease, pt now presents to [facility] for staged PCI.     Cardiac catheterization (6/20/23): PTCA/KRUPA x 1 (Synergy 32 x 3 mm) mid RCA, and other findings LM normal, mid LAD 30%, D1 50-60%, LCx/OM1 bifurcation 80%, proximal RCA 30%, distal RCA 30%, EDP 12 mmHg. Right radial access.     CCTA (5/1/23): Ca score 103, severe mixed disease in the mLCx, severe mixed disease in the mid RCA, mild-mod mixed disease in the D1, mod mixed in the ramus intermedius, which is a small caliber vessel, remaining coronary artery segments appear non-obstructive. Cardio: Dr. Aruna Crow  Pharmacy: [address] [place] [address] Pharmacy [id number]  Escort: wife    57-year-old male, current smoker (45 pack years), PMHx HTN, HLD, CAD (s/p recent PCI 6/20/23: PTCA/KRUPA x1 mRCA; LCx/OM1 bifurcation 80%), TIA (on aspirin, statin) who originally presented to their outpatient cardiologist Dr. Aruna Crow complaining of SSCP w/ WHITE upon climbing mild incline. Patient underwent [facility] w/ PCI on 6/20/23 with residual disease in LCx/OM1 bifurcation with plan for staging. Since procedure, patient reports feeling much better, with drastic improvement in his substernal chest pain, but does continue to experience mild SOB with stairs and endorses strict adherence to DAPT therapy as prescribed. Patient denies: dizziness, palpitations, orthopnea/PND, leg swelling, LOC, bleeding, melena/hematochezia, fever, chills, URI symptoms, or recent illness.  In light of patient's risk factors, continued CCS III anginal-equivalent symptoms and known residual LCx/OM1 disease, pt now presents to [facility] for staged PCI.     Cardiac catheterization (6/20/23): PTCA/KRUPA x 1 (Synergy 32 x 3 mm) mid RCA, and other findings LM normal, mid LAD 30%, D1 50-60%, LCx/OM1 bifurcation 80%, proximal RCA 30%, distal RCA 30%, EDP 12 mmHg. Right radial access.     CCTA (5/1/23): Ca score 103, severe mixed disease in the mLCx, severe mixed disease in the mid RCA, mild-mod mixed disease in the D1, mod mixed in the ramus intermedius, which is a small caliber vessel, remaining coronary artery segments appear non-obstructive.    TTE (4/5/23): LVEF 55-60%, mild concentric LVH, G1DD, mild AV sclerosis.  Cardio: Dr. Aruna Crow  Pharmacy: [address] [place] [address] Pharmacy [id number]  Escort: wife    57-year-old male, current smoker (45 pack years), PMHx HTN, HLD, CAD (s/p recent PCI 6/20/23: PTCA/KRUPA x1 mRCA; LCx/OM1 bifurcation 80%), TIA (on aspirin, statin) who originally presented to their outpatient cardiologist Dr. Aruna Crow complaining of SSCP w/ WHITE upon climbing mild incline. Patient underwent [facility] w/ PCI on 6/20/23 with residual disease in LCx/OM1 bifurcation with plan for staging. Since procedure, patient reports feeling much better, with drastic improvement in his substernal chest pain, but does continue to experience chest pain and SOB with stairs and endorses strict adherence to DAPT therapy as prescribed. Patient denies: dizziness, palpitations, orthopnea/PND, leg swelling, LOC, bleeding, melena/hematochezia, fever, chills, URI symptoms, or recent illness.  In light of patient's risk factors, continued CCS III anginal-equivalent symptoms and known residual LCx/OM1 disease, pt now presents to [facility] for staged PCI.     Cardiac catheterization (6/20/23): PTCA/KRUPA x 1 (Synergy 32 x 3 mm) mid RCA, and other findings LM normal, mid LAD 30%, D1 50-60%, LCx/OM1 bifurcation 80%, proximal RCA 30%, distal RCA 30%, EDP 12 mmHg. Right radial access.     CCTA (5/1/23): Ca score 103, severe mixed disease in the mLCx, severe mixed disease in the mid RCA, mild-mod mixed disease in the D1, mod mixed in the ramus intermedius, which is a small caliber vessel, remaining coronary artery segments appear non-obstructive.    TTE (4/5/23): LVEF 55-60%, mild concentric LVH, G1DD, mild AV sclerosis.

## 2023-07-31 ENCOUNTER — INPATIENT (INPATIENT)
Facility: HOSPITAL | Age: 58
LOS: 0 days | Discharge: HOME | DRG: 247 | End: 2023-08-01
Attending: INTERNAL MEDICINE | Admitting: INTERNAL MEDICINE
Payer: COMMERCIAL

## 2023-07-31 LAB
A1C WITH ESTIMATED AVERAGE GLUCOSE RESULT: 6.6 % — HIGH (ref 4–5.6)
ALBUMIN SERPL ELPH-MCNC: 4.2 G/DL — SIGNIFICANT CHANGE UP (ref 3.3–5)
ALBUMIN SERPL ELPH-MCNC: 4.4 G/DL — SIGNIFICANT CHANGE UP (ref 3.3–5)
ALP SERPL-CCNC: 67 U/L — SIGNIFICANT CHANGE UP (ref 40–120)
ALP SERPL-CCNC: 68 U/L — SIGNIFICANT CHANGE UP (ref 40–120)
ALT FLD-CCNC: 22 U/L — SIGNIFICANT CHANGE UP (ref 10–45)
ALT FLD-CCNC: 23 U/L — SIGNIFICANT CHANGE UP (ref 10–45)
ANION GAP SERPL CALC-SCNC: 10 MMOL/L — SIGNIFICANT CHANGE UP (ref 5–17)
ANION GAP SERPL CALC-SCNC: 11 MMOL/L — SIGNIFICANT CHANGE UP (ref 5–17)
APPEARANCE UR: CLEAR — SIGNIFICANT CHANGE UP
APTT BLD: 34.2 SEC — SIGNIFICANT CHANGE UP (ref 24.5–35.6)
AST SERPL-CCNC: 17 U/L — SIGNIFICANT CHANGE UP (ref 10–40)
AST SERPL-CCNC: 19 U/L — SIGNIFICANT CHANGE UP (ref 10–40)
BASE EXCESS BLDV CALC-SCNC: 5.7 MMOL/L — HIGH (ref -2–3)
BASOPHILS # BLD AUTO: 0.08 K/UL — SIGNIFICANT CHANGE UP (ref 0–0.2)
BASOPHILS NFR BLD AUTO: 1 % — SIGNIFICANT CHANGE UP (ref 0–2)
BILIRUB SERPL-MCNC: 0.4 MG/DL — SIGNIFICANT CHANGE UP (ref 0.2–1.2)
BILIRUB SERPL-MCNC: 0.4 MG/DL — SIGNIFICANT CHANGE UP (ref 0.2–1.2)
BILIRUB UR-MCNC: NEGATIVE — SIGNIFICANT CHANGE UP
BLOOD GAS VENOUS - BLOOD UREA NITROGEN: 33 MG/DL — HIGH (ref 7–23)
BLOOD GAS VENOUS - CREATININE: 1.8 MG/DL — HIGH (ref 0.5–1.3)
BUN SERPL-MCNC: 29 MG/DL — HIGH (ref 7–23)
BUN SERPL-MCNC: 29 MG/DL — HIGH (ref 7–23)
CA-I SERPL-SCNC: 1.26 MMOL/L — SIGNIFICANT CHANGE UP (ref 1.15–1.33)
CA-I SERPL-SCNC: 1.3 MMOL/L — SIGNIFICANT CHANGE UP (ref 1.15–1.33)
CALCIUM SERPL-MCNC: 9.8 MG/DL — SIGNIFICANT CHANGE UP (ref 8.4–10.5)
CALCIUM SERPL-MCNC: 9.8 MG/DL — SIGNIFICANT CHANGE UP (ref 8.4–10.5)
CHLORIDE BLDV-SCNC: 99 MMOL/L — SIGNIFICANT CHANGE UP (ref 96–108)
CHLORIDE SERPL-SCNC: 102 MMOL/L — SIGNIFICANT CHANGE UP (ref 96–108)
CHLORIDE SERPL-SCNC: 102 MMOL/L — SIGNIFICANT CHANGE UP (ref 96–108)
CHOLEST SERPL-MCNC: 136 MG/DL — SIGNIFICANT CHANGE UP
CK MB CFR SERPL CALC: 1.8 NG/ML — SIGNIFICANT CHANGE UP (ref 0–6.7)
CK SERPL-CCNC: 252 U/L — HIGH (ref 30–200)
CO2 BLDV-SCNC: 27.3 MMOL/L — HIGH (ref 22–26)
CO2 BLDV-SCNC: 35.1 MMOL/L — HIGH (ref 22–26)
CO2 SERPL-SCNC: 29 MMOL/L — SIGNIFICANT CHANGE UP (ref 22–31)
CO2 SERPL-SCNC: 29 MMOL/L — SIGNIFICANT CHANGE UP (ref 22–31)
COHGB MFR BLDV: 3.1 % — HIGH
COLOR SPEC: YELLOW — SIGNIFICANT CHANGE UP
CREAT ?TM UR-MCNC: 60 MG/DL — SIGNIFICANT CHANGE UP
CREAT SERPL-MCNC: 1.71 MG/DL — HIGH (ref 0.5–1.3)
CREAT SERPL-MCNC: 1.72 MG/DL — HIGH (ref 0.5–1.3)
DIFF PNL FLD: NEGATIVE — SIGNIFICANT CHANGE UP
EGFR: 45 ML/MIN/1.73M2 — LOW
EGFR: 46 ML/MIN/1.73M2 — LOW
EOSINOPHIL # BLD AUTO: 0.49 K/UL — SIGNIFICANT CHANGE UP (ref 0–0.5)
EOSINOPHIL NFR BLD AUTO: 6.1 % — HIGH (ref 0–6)
ESTIMATED AVERAGE GLUCOSE: 143 MG/DL — HIGH (ref 68–114)
GAS PNL BLDV: 136 MMOL/L — SIGNIFICANT CHANGE UP (ref 136–145)
GAS PNL BLDV: 137 MMOL/L — SIGNIFICANT CHANGE UP (ref 136–145)
GLUCOSE BLDV-MCNC: 108 MG/DL — HIGH (ref 70–99)
GLUCOSE BLDV-MCNC: 108 MG/DL — HIGH (ref 70–99)
GLUCOSE SERPL-MCNC: 106 MG/DL — HIGH (ref 70–99)
GLUCOSE SERPL-MCNC: 114 MG/DL — HIGH (ref 70–99)
GLUCOSE UR QL: NEGATIVE — SIGNIFICANT CHANGE UP
HCO3 BLDV-SCNC: 33 MMOL/L — HIGH (ref 22–29)
HCT VFR BLD CALC: 46.8 % — SIGNIFICANT CHANGE UP (ref 39–50)
HCT VFR BLDA CALC: 47 % — SIGNIFICANT CHANGE UP
HCT VFR BLDA CALC: 50 % — SIGNIFICANT CHANGE UP
HDLC SERPL-MCNC: 29 MG/DL — LOW
HGB BLD CALC-MCNC: 15.6 G/DL — SIGNIFICANT CHANGE UP (ref 12.6–17.4)
HGB BLD-MCNC: 15.4 G/DL — SIGNIFICANT CHANGE UP (ref 13–17)
IMM GRANULOCYTES NFR BLD AUTO: 0.6 % — SIGNIFICANT CHANGE UP (ref 0–0.9)
INR BLD: 0.9 — SIGNIFICANT CHANGE UP (ref 0.85–1.18)
ISTAT ACTK (ACTIVATED CLOTTING TIME KAOLIN): 353 SEC — HIGH (ref 74–137)
ISTAT INR: 1 — SIGNIFICANT CHANGE UP (ref 0.88–1.16)
ISTAT PT: 11.9 SEC — SIGNIFICANT CHANGE UP (ref 10–12.9)
KETONES UR-MCNC: NEGATIVE — SIGNIFICANT CHANGE UP
LACTATE BLDV-MCNC: 1.2 MMOL/L — SIGNIFICANT CHANGE UP (ref 0.5–2)
LDLC SERPL DIRECT ASSAY-MCNC: 74 MG/DL — SIGNIFICANT CHANGE UP
LEUKOCYTE ESTERASE UR-ACNC: NEGATIVE — SIGNIFICANT CHANGE UP
LIPID PNL WITH DIRECT LDL SERPL: SIGNIFICANT CHANGE UP MG/DL
LYMPHOCYTES # BLD AUTO: 2.07 K/UL — SIGNIFICANT CHANGE UP (ref 1–3.3)
LYMPHOCYTES # BLD AUTO: 25.6 % — SIGNIFICANT CHANGE UP (ref 13–44)
MAGNESIUM SERPL-MCNC: 1.9 MG/DL — SIGNIFICANT CHANGE UP (ref 1.6–2.6)
MCHC RBC-ENTMCNC: 31 PG — SIGNIFICANT CHANGE UP (ref 27–34)
MCHC RBC-ENTMCNC: 32.9 GM/DL — SIGNIFICANT CHANGE UP (ref 32–36)
MCV RBC AUTO: 94.2 FL — SIGNIFICANT CHANGE UP (ref 80–100)
METHGB MFR BLDV: 0 % — SIGNIFICANT CHANGE UP
MONOCYTES # BLD AUTO: 0.78 K/UL — SIGNIFICANT CHANGE UP (ref 0–0.9)
MONOCYTES NFR BLD AUTO: 9.6 % — SIGNIFICANT CHANGE UP (ref 2–14)
NEUTROPHILS # BLD AUTO: 4.62 K/UL — SIGNIFICANT CHANGE UP (ref 1.8–7.4)
NEUTROPHILS NFR BLD AUTO: 57.1 % — SIGNIFICANT CHANGE UP (ref 43–77)
NITRITE UR-MCNC: NEGATIVE — SIGNIFICANT CHANGE UP
NON HDL CHOLESTEROL: 107 MG/DL — SIGNIFICANT CHANGE UP
NRBC # BLD: 0 /100 WBCS — SIGNIFICANT CHANGE UP (ref 0–0)
OSMOLALITY UR: 423 MOSM/KG — SIGNIFICANT CHANGE UP (ref 300–900)
PCO2 BLDV: 59 MMHG — HIGH (ref 42–55)
PCO2 BLDV: 63 MMHG — HIGH (ref 42–55)
PH BLDV: 7.32 — SIGNIFICANT CHANGE UP (ref 7.32–7.43)
PH BLDV: 7.36 — SIGNIFICANT CHANGE UP (ref 7.32–7.43)
PH UR: 6 — SIGNIFICANT CHANGE UP (ref 5–8)
PLATELET # BLD AUTO: 227 K/UL — SIGNIFICANT CHANGE UP (ref 150–400)
PO2 BLDV: <33 MMHG — LOW (ref 25–45)
POTASSIUM BLDV-SCNC: 3.9 MMOL/L — SIGNIFICANT CHANGE UP (ref 3.5–5.1)
POTASSIUM BLDV-SCNC: 4 MMOL/L — SIGNIFICANT CHANGE UP (ref 3.5–5.1)
POTASSIUM SERPL-MCNC: 4 MMOL/L — SIGNIFICANT CHANGE UP (ref 3.5–5.3)
POTASSIUM SERPL-MCNC: 4.1 MMOL/L — SIGNIFICANT CHANGE UP (ref 3.5–5.3)
POTASSIUM SERPL-SCNC: 4 MMOL/L — SIGNIFICANT CHANGE UP (ref 3.5–5.3)
POTASSIUM SERPL-SCNC: 4.1 MMOL/L — SIGNIFICANT CHANGE UP (ref 3.5–5.3)
POTASSIUM UR-SCNC: 53 MMOL/L — SIGNIFICANT CHANGE UP
PROT ?TM UR-MCNC: 4 MG/DL — SIGNIFICANT CHANGE UP (ref 0–12)
PROT SERPL-MCNC: 7.9 G/DL — SIGNIFICANT CHANGE UP (ref 6–8.3)
PROT SERPL-MCNC: 8 G/DL — SIGNIFICANT CHANGE UP (ref 6–8.3)
PROT UR-MCNC: NEGATIVE MG/DL — SIGNIFICANT CHANGE UP
PROT/CREAT UR-RTO: 0.1 RATIO — SIGNIFICANT CHANGE UP (ref 0–0.2)
PROTHROM AB SERPL-ACNC: 10.3 SEC — SIGNIFICANT CHANGE UP (ref 9.5–13)
RBC # BLD: 4.97 M/UL — SIGNIFICANT CHANGE UP (ref 4.2–5.8)
RBC # FLD: 13.4 % — SIGNIFICANT CHANGE UP (ref 10.3–14.5)
SAO2 % BLDV: 32 % — LOW (ref 67–88)
SODIUM SERPL-SCNC: 141 MMOL/L — SIGNIFICANT CHANGE UP (ref 135–145)
SODIUM SERPL-SCNC: 142 MMOL/L — SIGNIFICANT CHANGE UP (ref 135–145)
SODIUM UR-SCNC: 54 MMOL/L — SIGNIFICANT CHANGE UP
SP GR SPEC: 1.01 — SIGNIFICANT CHANGE UP (ref 1–1.03)
TRIGL SERPL-MCNC: 419 MG/DL — HIGH
UROBILINOGEN FLD QL: 0.2 E.U./DL — SIGNIFICANT CHANGE UP
UUN UR-MCNC: 544 MG/DL — SIGNIFICANT CHANGE UP
WBC # BLD: 8.09 K/UL — SIGNIFICANT CHANGE UP (ref 3.8–10.5)
WBC # FLD AUTO: 8.09 K/UL — SIGNIFICANT CHANGE UP (ref 3.8–10.5)

## 2023-07-31 PROCEDURE — 99223 1ST HOSP IP/OBS HIGH 75: CPT

## 2023-07-31 PROCEDURE — 76775 US EXAM ABDO BACK WALL LIM: CPT | Mod: 26

## 2023-07-31 RX ORDER — DIPHENHYDRAMINE HCL 50 MG
50 CAPSULE ORAL ONCE
Refills: 0 | Status: COMPLETED | OUTPATIENT
Start: 2023-07-31 | End: 2023-07-31

## 2023-07-31 RX ORDER — HYDROCHLOROTHIAZIDE 25 MG
1 TABLET ORAL
Refills: 0 | DISCHARGE

## 2023-07-31 RX ORDER — ASPIRIN/CALCIUM CARB/MAGNESIUM 324 MG
81 TABLET ORAL DAILY
Refills: 0 | Status: DISCONTINUED | OUTPATIENT
Start: 2023-08-01 | End: 2023-08-01

## 2023-07-31 RX ORDER — LOSARTAN POTASSIUM 100 MG/1
1 TABLET, FILM COATED ORAL
Refills: 0 | DISCHARGE

## 2023-07-31 RX ORDER — SODIUM CHLORIDE 9 MG/ML
500 INJECTION INTRAMUSCULAR; INTRAVENOUS; SUBCUTANEOUS
Refills: 0 | Status: DISCONTINUED | OUTPATIENT
Start: 2023-07-31 | End: 2023-07-31

## 2023-07-31 RX ORDER — CLOPIDOGREL BISULFATE 75 MG/1
75 TABLET, FILM COATED ORAL ONCE
Refills: 0 | Status: COMPLETED | OUTPATIENT
Start: 2023-07-31 | End: 2023-07-31

## 2023-07-31 RX ORDER — TILDRAKIZUMAB-ASMN 100 MG/ML
1 INJECTION, SOLUTION SUBCUTANEOUS
Refills: 0 | DISCHARGE

## 2023-07-31 RX ORDER — HYDROCORTISONE 20 MG
200 TABLET ORAL ONCE
Refills: 0 | Status: COMPLETED | OUTPATIENT
Start: 2023-07-31 | End: 2023-07-31

## 2023-07-31 RX ORDER — SODIUM CHLORIDE 9 MG/ML
250 INJECTION INTRAMUSCULAR; INTRAVENOUS; SUBCUTANEOUS ONCE
Refills: 0 | Status: COMPLETED | OUTPATIENT
Start: 2023-07-31 | End: 2023-07-31

## 2023-07-31 RX ORDER — ASPIRIN/CALCIUM CARB/MAGNESIUM 324 MG
81 TABLET ORAL ONCE
Refills: 0 | Status: COMPLETED | OUTPATIENT
Start: 2023-07-31 | End: 2023-07-31

## 2023-07-31 RX ORDER — SODIUM CHLORIDE 9 MG/ML
1000 INJECTION INTRAMUSCULAR; INTRAVENOUS; SUBCUTANEOUS
Refills: 0 | Status: DISCONTINUED | OUTPATIENT
Start: 2023-07-31 | End: 2023-08-01

## 2023-07-31 RX ORDER — MAGNESIUM OXIDE 400 MG ORAL TABLET 241.3 MG
400 TABLET ORAL ONCE
Refills: 0 | Status: COMPLETED | OUTPATIENT
Start: 2023-07-31 | End: 2023-07-31

## 2023-07-31 RX ORDER — AMLODIPINE BESYLATE 2.5 MG/1
5 TABLET ORAL DAILY
Refills: 0 | Status: DISCONTINUED | OUTPATIENT
Start: 2023-08-01 | End: 2023-08-01

## 2023-07-31 RX ORDER — SODIUM CHLORIDE 9 MG/ML
250 INJECTION INTRAMUSCULAR; INTRAVENOUS; SUBCUTANEOUS ONCE
Refills: 0 | Status: DISCONTINUED | OUTPATIENT
Start: 2023-07-31 | End: 2023-07-31

## 2023-07-31 RX ORDER — CLOPIDOGREL BISULFATE 75 MG/1
75 TABLET, FILM COATED ORAL DAILY
Refills: 0 | Status: DISCONTINUED | OUTPATIENT
Start: 2023-08-01 | End: 2023-08-01

## 2023-07-31 RX ORDER — AMLODIPINE BESYLATE 2.5 MG/1
1 TABLET ORAL
Refills: 0 | DISCHARGE

## 2023-07-31 RX ORDER — ATORVASTATIN CALCIUM 80 MG/1
80 TABLET, FILM COATED ORAL AT BEDTIME
Refills: 0 | Status: DISCONTINUED | OUTPATIENT
Start: 2023-07-31 | End: 2023-08-01

## 2023-07-31 RX ORDER — SODIUM CHLORIDE 9 MG/ML
500 INJECTION INTRAMUSCULAR; INTRAVENOUS; SUBCUTANEOUS
Refills: 0 | Status: DISCONTINUED | OUTPATIENT
Start: 2023-07-31 | End: 2023-08-01

## 2023-07-31 RX ORDER — LOSARTAN POTASSIUM 100 MG/1
100 TABLET, FILM COATED ORAL DAILY
Refills: 0 | Status: DISCONTINUED | OUTPATIENT
Start: 2023-08-01 | End: 2023-08-01

## 2023-07-31 RX ADMIN — CLOPIDOGREL BISULFATE 75 MILLIGRAM(S): 75 TABLET, FILM COATED ORAL at 08:28

## 2023-07-31 RX ADMIN — Medication 50 MILLIGRAM(S): at 08:29

## 2023-07-31 RX ADMIN — MAGNESIUM OXIDE 400 MG ORAL TABLET 400 MILLIGRAM(S): 241.3 TABLET ORAL at 08:28

## 2023-07-31 RX ADMIN — SODIUM CHLORIDE 100 MILLILITER(S): 9 INJECTION INTRAMUSCULAR; INTRAVENOUS; SUBCUTANEOUS at 19:55

## 2023-07-31 RX ADMIN — SODIUM CHLORIDE 500 MILLILITER(S): 9 INJECTION INTRAMUSCULAR; INTRAVENOUS; SUBCUTANEOUS at 08:30

## 2023-07-31 RX ADMIN — SODIUM CHLORIDE 250 MILLILITER(S): 9 INJECTION INTRAMUSCULAR; INTRAVENOUS; SUBCUTANEOUS at 15:13

## 2023-07-31 RX ADMIN — Medication 81 MILLIGRAM(S): at 08:28

## 2023-07-31 RX ADMIN — Medication 200 MILLIGRAM(S): at 08:29

## 2023-07-31 RX ADMIN — ATORVASTATIN CALCIUM 80 MILLIGRAM(S): 80 TABLET, FILM COATED ORAL at 21:52

## 2023-07-31 NOTE — CONSULT NOTE ADULT - SUBJECTIVE AND OBJECTIVE BOX
HPI:  57-year-old male w/ HTN, current smoker (45 pack years) who is admitted for staged PCI. Nephrology consulted for Cr 1.7. Pt underwent cardiac cath  and had Cr 1.15 pre-cath and remained stable when check post-cath next day by outpatient cardiology. Now admitted with Cr 1.7. Patient denies any nausea, vomiting, diarrhea. Denies any urinary symptoms. No new nephrotoxic meds. No use of PPIs or NSIADs at home. No fever, chills, recent abx. No rash after the last cardiac cath.         PAST MEDICAL & SURGICAL HISTORY:  HTN (hypertension)      HLD (hyperlipidemia)      CAD (coronary artery disease)      No significant past surgical history            Allergies:  No Known Drug Allergies  Crab (Other)      Home Medications:   amLODIPine 5 mg oral tablet: 1 orally once a day  Aspirin Enteric Coated 81 mg oral delayed release tablet: 1 tab(s) orally once a day  atorvastatin 80 mg oral tablet: 1 tab(s) orally once a day (at bedtime)  clopidogrel 75 mg oral tablet: 1 tab(s) orally once a day  Cozaar 100 mg oral tablet: 1 orally once a day  hydroCHLOROthiazide 25 mg oral tablet: 1 orally once a day  Ilumya 100 mg/mL subcutaneous solution: 1 dose(s) subcutaneously every 12 weeks      Hospital Medications:   MEDICATIONS  (STANDING):  chlorhexidine 4% Liquid 1 Application(s) Topical once  sodium chloride 0.9% Bolus 250 milliLiter(s) IV Bolus Once  sodium chloride 0.9%. 500 milliLiter(s) (75 mL/Hr) IV Continuous <Continuous>      SOCIAL HISTORY:  Denies ETOh, Smoking    Family History:  FAMILY HISTORY:  No pertinent family history in first degree relatives          VITALS:  Not obtained    Height (cm): 170.2 ( @ 08:25)  Weight (kg): 72.575 ( @ 08:25)  BMI (kg/m2): 25.1 ( @ 08:25)  BSA (m2): 1.84 ( @ 08:25)  CAPILLARY BLOOD GLUCOSE          Review of Systems:  Negative except as mentioned in HPI    PHYSICAL EXAM:  GENERAL: Alert, awake, oriented x3   CHEST/LUNG: Bilateral clear breath sounds  HEART: Regular rate and rhythm  ABDOMEN: Soft, nontender, non distended  EXTREMITIES: no edema  Neurology: AAOx3      LABS:      141  |  102  |  29<H>  ----------------------------<  114<H>  4.1   |  29  |  1.72<H>    Ca    9.8      2023 08:32  Mg     1.9         TPro  7.9  /  Alb  4.2  /  TBili  0.4  /  DBili      /  AST  17  /  ALT  22  /  AlkPhos  68      Creatinine Trend: 1.72 <--, 1.71 <--                        15.4   8.09  )-----------( 227      ( 2023 06:54 )             46.8     Urine Studies:  Urinalysis Basic - ( 2023 11:39 )    Color: Yellow / Appearance: Clear / S.010 / pH:   Gluc:  / Ketone: NEGATIVE  / Bili: Negative / Urobili: 0.2 E.U./dL   Blood:  / Protein: NEGATIVE mg/dL / Nitrite: NEGATIVE   Leuk Esterase: NEGATIVE / RBC:  / WBC    Sq Epi:  / Non Sq Epi:  / Bacteria:       Sodium, Random Urine: 54 mmol/L ( @ 11:39)  Creatinine, Random Urine: 60 mg/dL ( @ 11:39)  Protein/Creatinine Ratio Calculation: 0.1 Ratio ( @ 11:39)  Osmolality, Random Urine: 423 mosm/kg ( @ 11:39)  Potassium, Random Urine: 53 mmol/L ( @ 11:39)

## 2023-07-31 NOTE — PATIENT PROFILE ADULT - FALL HARM RISK - HARM RISK INTERVENTIONS

## 2023-07-31 NOTE — CONSULT NOTE ADULT - ATTENDING COMMENTS
NOÉ cause not certain - perhaps had MARICRUZ after last cath after dc? -- appears euvolemic with benign UA and no retention  w/u as above  getting IVF-- limit contrast as able and follow renal fxn ONÉ cause not certain - perhaps had MARICRUZ after last cath after dc? -- no signs of chol emboli.   appears euvolemic with benign UA and no retention  w/u as above  getting IVF-- limit contrast as able and follow renal fxn NOÉ cause not certain - perhaps had MARICRUZ after last cath after dc? -- no signs of chol emboli.  has been newly on ARB and diuretic since April with improved BP and could be a bit on dry side-- though creat from June was 1.2.   appears euvolemic with benign UA and no retention  w/u as above  getting IVF-- limit contrast as able and follow renal fxn

## 2023-07-31 NOTE — CONSULT NOTE ADULT - ASSESSMENT
58 yo M w/ HTN admitted for staged PCI. Nephrology consulted 7/31 for elevated Cr 1.71 and prevention of MARICRUZ.    #Non-oliguric NOÉ  BCr 1.1. Cr was 1.15 pre-cath 6/20 and was stable checked right after the cath as outpatient  Etiology unclear. No new nephrotoxic meds. No hypotension reported. No urinary symptoms. Adequate PO intake. Normal Hb and Ca so MM less likely. Possibly had MARICRUZ from last cath and has residual CKD vs progression of CKD due to HTN  Bladder scan today without any post void retention  UA 7/31 bland. UPCR 0.1. Jason 54    Recommend:  Renal sono  c/w pre-cath hydration  Post-cath hydration NS @75 ml/hr x 6 hours  Ensure no drops in BP  Strict I&Os  Nephrology will continue to follow

## 2023-07-31 NOTE — CONSULT NOTE ADULT - ATTENDING SUPERVISION STATEMENT
Fellow Glycopyrrolate Counseling:  I discussed with the patient the risks of glycopyrrolate including but not limited to skin rash, drowsiness, dry mouth, difficulty urinating, and blurred vision.

## 2023-08-01 ENCOUNTER — TRANSCRIPTION ENCOUNTER (OUTPATIENT)
Age: 58
End: 2023-08-01

## 2023-08-01 ENCOUNTER — NON-APPOINTMENT (OUTPATIENT)
Age: 58
End: 2023-08-01

## 2023-08-01 VITALS
DIASTOLIC BLOOD PRESSURE: 71 MMHG | RESPIRATION RATE: 16 BRPM | HEART RATE: 52 BPM | OXYGEN SATURATION: 98 % | TEMPERATURE: 98 F | SYSTOLIC BLOOD PRESSURE: 135 MMHG

## 2023-08-01 PROBLEM — Z00.00 ENCOUNTER FOR PREVENTIVE HEALTH EXAMINATION: Status: ACTIVE | Noted: 2023-08-01

## 2023-08-01 LAB
ALBUMIN SERPL ELPH-MCNC: 3.5 G/DL — SIGNIFICANT CHANGE UP (ref 3.3–5)
ALBUMIN SERPL ELPH-MCNC: 3.9 G/DL — SIGNIFICANT CHANGE UP (ref 3.3–5)
ALP SERPL-CCNC: 60 U/L — SIGNIFICANT CHANGE UP (ref 40–120)
ALP SERPL-CCNC: 61 U/L — SIGNIFICANT CHANGE UP (ref 40–120)
ALT FLD-CCNC: 17 U/L — SIGNIFICANT CHANGE UP (ref 10–45)
ALT FLD-CCNC: 18 U/L — SIGNIFICANT CHANGE UP (ref 10–45)
ANION GAP SERPL CALC-SCNC: 12 MMOL/L — SIGNIFICANT CHANGE UP (ref 5–17)
ANION GAP SERPL CALC-SCNC: 8 MMOL/L — SIGNIFICANT CHANGE UP (ref 5–17)
AST SERPL-CCNC: 16 U/L — SIGNIFICANT CHANGE UP (ref 10–40)
AST SERPL-CCNC: 17 U/L — SIGNIFICANT CHANGE UP (ref 10–40)
BASOPHILS # BLD AUTO: 0.07 K/UL — SIGNIFICANT CHANGE UP (ref 0–0.2)
BASOPHILS NFR BLD AUTO: 0.8 % — SIGNIFICANT CHANGE UP (ref 0–2)
BILIRUB SERPL-MCNC: 0.4 MG/DL — SIGNIFICANT CHANGE UP (ref 0.2–1.2)
BILIRUB SERPL-MCNC: 0.4 MG/DL — SIGNIFICANT CHANGE UP (ref 0.2–1.2)
BUN SERPL-MCNC: 20 MG/DL — SIGNIFICANT CHANGE UP (ref 7–23)
BUN SERPL-MCNC: 22 MG/DL — SIGNIFICANT CHANGE UP (ref 7–23)
CALCIUM SERPL-MCNC: 8.6 MG/DL — SIGNIFICANT CHANGE UP (ref 8.4–10.5)
CALCIUM SERPL-MCNC: 9.2 MG/DL — SIGNIFICANT CHANGE UP (ref 8.4–10.5)
CHLORIDE SERPL-SCNC: 105 MMOL/L — SIGNIFICANT CHANGE UP (ref 96–108)
CHLORIDE SERPL-SCNC: 109 MMOL/L — HIGH (ref 96–108)
CO2 SERPL-SCNC: 21 MMOL/L — LOW (ref 22–31)
CO2 SERPL-SCNC: 25 MMOL/L — SIGNIFICANT CHANGE UP (ref 22–31)
CREAT SERPL-MCNC: 1.28 MG/DL — SIGNIFICANT CHANGE UP (ref 0.5–1.3)
CREAT SERPL-MCNC: 1.31 MG/DL — HIGH (ref 0.5–1.3)
EGFR: 63 ML/MIN/1.73M2 — SIGNIFICANT CHANGE UP
EGFR: 64 ML/MIN/1.73M2 — SIGNIFICANT CHANGE UP
EOSINOPHIL # BLD AUTO: 0.36 K/UL — SIGNIFICANT CHANGE UP (ref 0–0.5)
EOSINOPHIL NFR BLD AUTO: 4.1 % — SIGNIFICANT CHANGE UP (ref 0–6)
GLUCOSE SERPL-MCNC: 103 MG/DL — HIGH (ref 70–99)
GLUCOSE SERPL-MCNC: 113 MG/DL — HIGH (ref 70–99)
HCT VFR BLD CALC: 41.6 % — SIGNIFICANT CHANGE UP (ref 39–50)
HCT VFR BLD CALC: 42.1 % — SIGNIFICANT CHANGE UP (ref 39–50)
HGB BLD-MCNC: 13.9 G/DL — SIGNIFICANT CHANGE UP (ref 13–17)
HGB BLD-MCNC: 14.4 G/DL — SIGNIFICANT CHANGE UP (ref 13–17)
IMM GRANULOCYTES NFR BLD AUTO: 0.6 % — SIGNIFICANT CHANGE UP (ref 0–0.9)
LYMPHOCYTES # BLD AUTO: 2.01 K/UL — SIGNIFICANT CHANGE UP (ref 1–3.3)
LYMPHOCYTES # BLD AUTO: 22.8 % — SIGNIFICANT CHANGE UP (ref 13–44)
MAGNESIUM SERPL-MCNC: 1.9 MG/DL — SIGNIFICANT CHANGE UP (ref 1.6–2.6)
MAGNESIUM SERPL-MCNC: 2 MG/DL — SIGNIFICANT CHANGE UP (ref 1.6–2.6)
MCHC RBC-ENTMCNC: 30.8 PG — SIGNIFICANT CHANGE UP (ref 27–34)
MCHC RBC-ENTMCNC: 31.2 PG — SIGNIFICANT CHANGE UP (ref 27–34)
MCHC RBC-ENTMCNC: 33.4 GM/DL — SIGNIFICANT CHANGE UP (ref 32–36)
MCHC RBC-ENTMCNC: 34.2 GM/DL — SIGNIFICANT CHANGE UP (ref 32–36)
MCV RBC AUTO: 91.3 FL — SIGNIFICANT CHANGE UP (ref 80–100)
MCV RBC AUTO: 92.2 FL — SIGNIFICANT CHANGE UP (ref 80–100)
MONOCYTES # BLD AUTO: 0.95 K/UL — HIGH (ref 0–0.9)
MONOCYTES NFR BLD AUTO: 10.8 % — SIGNIFICANT CHANGE UP (ref 2–14)
NEUTROPHILS # BLD AUTO: 5.37 K/UL — SIGNIFICANT CHANGE UP (ref 1.8–7.4)
NEUTROPHILS NFR BLD AUTO: 60.9 % — SIGNIFICANT CHANGE UP (ref 43–77)
NRBC # BLD: 0 /100 WBCS — SIGNIFICANT CHANGE UP (ref 0–0)
NRBC # BLD: 0 /100 WBCS — SIGNIFICANT CHANGE UP (ref 0–0)
PLATELET # BLD AUTO: 211 K/UL — SIGNIFICANT CHANGE UP (ref 150–400)
PLATELET # BLD AUTO: 220 K/UL — SIGNIFICANT CHANGE UP (ref 150–400)
POTASSIUM SERPL-MCNC: 3.5 MMOL/L — SIGNIFICANT CHANGE UP (ref 3.5–5.3)
POTASSIUM SERPL-MCNC: 3.6 MMOL/L — SIGNIFICANT CHANGE UP (ref 3.5–5.3)
POTASSIUM SERPL-SCNC: 3.5 MMOL/L — SIGNIFICANT CHANGE UP (ref 3.5–5.3)
POTASSIUM SERPL-SCNC: 3.6 MMOL/L — SIGNIFICANT CHANGE UP (ref 3.5–5.3)
PROT SERPL-MCNC: 6.8 G/DL — SIGNIFICANT CHANGE UP (ref 6–8.3)
PROT SERPL-MCNC: 7.1 G/DL — SIGNIFICANT CHANGE UP (ref 6–8.3)
RBC # BLD: 4.51 M/UL — SIGNIFICANT CHANGE UP (ref 4.2–5.8)
RBC # BLD: 4.61 M/UL — SIGNIFICANT CHANGE UP (ref 4.2–5.8)
RBC # FLD: 13.1 % — SIGNIFICANT CHANGE UP (ref 10.3–14.5)
RBC # FLD: 13.2 % — SIGNIFICANT CHANGE UP (ref 10.3–14.5)
SODIUM SERPL-SCNC: 138 MMOL/L — SIGNIFICANT CHANGE UP (ref 135–145)
SODIUM SERPL-SCNC: 142 MMOL/L — SIGNIFICANT CHANGE UP (ref 135–145)
WBC # BLD: 8.71 K/UL — SIGNIFICANT CHANGE UP (ref 3.8–10.5)
WBC # BLD: 8.81 K/UL — SIGNIFICANT CHANGE UP (ref 3.8–10.5)
WBC # FLD AUTO: 8.71 K/UL — SIGNIFICANT CHANGE UP (ref 3.8–10.5)
WBC # FLD AUTO: 8.81 K/UL — SIGNIFICANT CHANGE UP (ref 3.8–10.5)

## 2023-08-01 PROCEDURE — 93010 ELECTROCARDIOGRAM REPORT: CPT

## 2023-08-01 PROCEDURE — C1887: CPT

## 2023-08-01 PROCEDURE — C1894: CPT

## 2023-08-01 PROCEDURE — C1874: CPT

## 2023-08-01 PROCEDURE — 83935 ASSAY OF URINE OSMOLALITY: CPT

## 2023-08-01 PROCEDURE — 83721 ASSAY OF BLOOD LIPOPROTEIN: CPT

## 2023-08-01 PROCEDURE — 36415 COLL VENOUS BLD VENIPUNCTURE: CPT

## 2023-08-01 PROCEDURE — 83735 ASSAY OF MAGNESIUM: CPT

## 2023-08-01 PROCEDURE — 84156 ASSAY OF PROTEIN URINE: CPT

## 2023-08-01 PROCEDURE — C1753: CPT

## 2023-08-01 PROCEDURE — 83036 HEMOGLOBIN GLYCOSYLATED A1C: CPT

## 2023-08-01 PROCEDURE — 84540 ASSAY OF URINE/UREA-N: CPT

## 2023-08-01 PROCEDURE — 82570 ASSAY OF URINE CREATININE: CPT

## 2023-08-01 PROCEDURE — C1725: CPT

## 2023-08-01 PROCEDURE — 80061 LIPID PANEL: CPT

## 2023-08-01 PROCEDURE — 85730 THROMBOPLASTIN TIME PARTIAL: CPT

## 2023-08-01 PROCEDURE — 76775 US EXAM ABDO BACK WALL LIM: CPT

## 2023-08-01 PROCEDURE — 99239 HOSP IP/OBS DSCHRG MGMT >30: CPT

## 2023-08-01 PROCEDURE — 80053 COMPREHEN METABOLIC PANEL: CPT

## 2023-08-01 PROCEDURE — 85347 COAGULATION TIME ACTIVATED: CPT

## 2023-08-01 PROCEDURE — 82553 CREATINE MB FRACTION: CPT

## 2023-08-01 PROCEDURE — 99231 SBSQ HOSP IP/OBS SF/LOW 25: CPT

## 2023-08-01 PROCEDURE — 93005 ELECTROCARDIOGRAM TRACING: CPT

## 2023-08-01 PROCEDURE — 85025 COMPLETE CBC W/AUTO DIFF WBC: CPT

## 2023-08-01 PROCEDURE — 82550 ASSAY OF CK (CPK): CPT

## 2023-08-01 PROCEDURE — 82803 BLOOD GASES ANY COMBINATION: CPT

## 2023-08-01 PROCEDURE — 81003 URINALYSIS AUTO W/O SCOPE: CPT

## 2023-08-01 PROCEDURE — 85027 COMPLETE CBC AUTOMATED: CPT

## 2023-08-01 PROCEDURE — 84300 ASSAY OF URINE SODIUM: CPT

## 2023-08-01 PROCEDURE — 84133 ASSAY OF URINE POTASSIUM: CPT

## 2023-08-01 PROCEDURE — C1760: CPT

## 2023-08-01 PROCEDURE — 85610 PROTHROMBIN TIME: CPT

## 2023-08-01 PROCEDURE — C1769: CPT

## 2023-08-01 RX ORDER — EZETIMIBE 10 MG/1
1 TABLET ORAL
Qty: 30 | Refills: 2
Start: 2023-08-01 | End: 2023-10-29

## 2023-08-01 RX ORDER — ASPIRIN/CALCIUM CARB/MAGNESIUM 324 MG
1 TABLET ORAL
Qty: 30 | Refills: 11
Start: 2023-08-01 | End: 2024-07-25

## 2023-08-01 RX ORDER — ATORVASTATIN CALCIUM 80 MG/1
1 TABLET, FILM COATED ORAL
Qty: 30 | Refills: 3
Start: 2023-08-01 | End: 2023-11-28

## 2023-08-01 RX ORDER — CLOPIDOGREL BISULFATE 75 MG/1
1 TABLET, FILM COATED ORAL
Qty: 30 | Refills: 11
Start: 2023-08-01 | End: 2024-07-25

## 2023-08-01 RX ORDER — POTASSIUM CHLORIDE 20 MEQ
40 PACKET (EA) ORAL ONCE
Refills: 0 | Status: COMPLETED | OUTPATIENT
Start: 2023-08-01 | End: 2023-08-01

## 2023-08-01 RX ADMIN — LOSARTAN POTASSIUM 100 MILLIGRAM(S): 100 TABLET, FILM COATED ORAL at 06:09

## 2023-08-01 RX ADMIN — CLOPIDOGREL BISULFATE 75 MILLIGRAM(S): 75 TABLET, FILM COATED ORAL at 11:54

## 2023-08-01 RX ADMIN — Medication 40 MILLIEQUIVALENT(S): at 11:55

## 2023-08-01 RX ADMIN — AMLODIPINE BESYLATE 5 MILLIGRAM(S): 2.5 TABLET ORAL at 06:09

## 2023-08-01 RX ADMIN — Medication 81 MILLIGRAM(S): at 11:54

## 2023-08-01 NOTE — DISCHARGE NOTE NURSING/CASE MANAGEMENT/SOCIAL WORK - PATIENT PORTAL LINK FT
You can access the FollowMyHealth Patient Portal offered by [facility] by registering at the following website: [email or web address]. By joining [facility]’s FollowMyHealth portal, you will also be able to view your health information using other applications (apps) compatible with our system.

## 2023-08-01 NOTE — DISCHARGE NOTE PROVIDER - NSDCMRMEDTOKEN_GEN_ALL_CORE_FT
amLODIPine 5 mg oral tablet: 1 orally once a day  Aspirin Enteric Coated 81 mg oral delayed release tablet: 1 tab(s) orally once a day  atorvastatin 80 mg oral tablet: 1 tab(s) orally once a day (at bedtime)  clopidogrel 75 mg oral tablet: 1 tab(s) orally once a day  Cozaar 100 mg oral tablet: 1 orally once a day  hydroCHLOROthiazide 25 mg oral tablet: 1 orally once a day  Ilumya 100 mg/mL subcutaneous solution: 1 dose(s) subcutaneously every 12 weeks   amLODIPine 5 mg oral tablet: 1 orally once a day  Aspirin Enteric Coated 81 mg oral delayed release tablet: 1 tab(s) orally once a day  atorvastatin 80 mg oral tablet: 1 tab(s) orally once a day (at bedtime)  Cardiac Rehab: Cardiac Rehab: 3x per week for 12 weeks.  clopidogrel 75 mg oral tablet: 1 tab(s) orally once a day  Cozaar 100 mg oral tablet: 1 orally once a day  hydroCHLOROthiazide 25 mg oral tablet: 1 orally once a day  Ilumya 100 mg/mL subcutaneous solution: 1 dose(s) subcutaneously every 12 weeks  Zetia 10 mg oral tablet: 1 tab(s) orally once a day

## 2023-08-01 NOTE — PROGRESS NOTE ADULT - ASSESSMENT
56 yo M w/ HTN admitted for staged PCI. Nephrology consulted 7/31 for elevated Cr 1.71 and prevention of MARICRUZ.    #Non-oliguric NOÉ  BCr 1.1. Cr was 1.15 pre-cath 6/20 and was stable checked right after the cath as outpatient  Etiology unclear. No new nephrotoxic meds. No hypotension reported. No urinary symptoms. Adequate PO intake. Normal Hb and Ca so MM less likely. Possibly had MARICRUZ from last cath and has residual CKD vs progression of CKD due to HTN. Patient improved with fluids, most likely pre-renal etiology iso hypovolemia      Recommend:  - encourage PO intake   - repeat lab work within 2 weeks of discharge

## 2023-08-01 NOTE — DISCHARGE NOTE NURSING/CASE MANAGEMENT/SOCIAL WORK - NSDCPEFALRISK_GEN_ALL_CORE
For information on Fall & Injury Prevention, visit: [email or web address] OR  [email or web address] OR  https://www.cdc.gov/steadi/patient.html

## 2023-08-01 NOTE — DISCHARGE NOTE PROVIDER - NSDCCPCAREPLAN_GEN_ALL_CORE_FT
PRINCIPAL DISCHARGE DIAGNOSIS  Diagnosis: CAD (coronary artery disease)  Assessment and Plan of Treatment: You were found to have blockages in the arteries of your heart, also known as Coronary Artery Disease. You underwent a cardiac catheterization on 7/31/23 and received a stent to the OM1/LCx artery.  PLEASE CONTINUE ASPIRIN 81MG DAILY AND PLAVIX 75MG DAILY. DO NOT STOP THESE MEDICATIONS FOR ANY REASON AS THEY ARE KEEPING YOUR STENT OPEN AND PREVENTING A HEART ATTACK.   Avoid strenuous activity or heavy lifting anything more than 5lbs for the next five days. Do not take a bath or swim for the next five days; you may shower. For any bleeding or hematoma formation (hardened blood collection under the skin) at the access site of your R groin please hold pressure and go to the emergency room. Please follow up with Dr. Shirley in 1 weeks. For recurrent chest pain, please call your doctor or go to the emergency room.  We have provided you with a prescription for cardiac rehab which is medically supervised exercise program for your heart and has been shown to improve the quantity and quality of life of people with heart disease like yours. You should attend cardiac rehab 3 times per week for 12 weeks. We have provided you with a list of nearby facilities. Please call your insurance carrier to determine which of these facilities are covered under your plan.        SECONDARY DISCHARGE DIAGNOSES  Diagnosis: HLD (hyperlipidemia)  Assessment and Plan of Treatment: Please continue Atorvastatin 80mg at bedtime to keep your cholesterol low. High cholesterol contributes to heart disease.      Diagnosis: HTN (hypertension)  Assessment and Plan of Treatment: Please continue Amlodipine, Hydrochlorthiazide, and losartan as listed to keep your blood pressure controlled. For blood pressure that is too high or too low please see your doctor or go to the emergency room as necessary.      Diagnosis: Current smoker  Assessment and Plan of Treatment: Please speak to your doctor about quitting smoking. If you continue to smoke, this will worsen your pre-existing high blood pressure and coronary artery disease.   The national quitline phone number is 2-804-QUIT NOW.    Diagnosis: History of TIAs  Assessment and Plan of Treatment: Please continue to take Aspirin and Plavix as prescribed and continue your routine follow ups with your doctor. If you feel you are experiencing stroke like symptoms please visit the ER for evaluaiton.

## 2023-08-01 NOTE — DISCHARGE NOTE PROVIDER - PROVIDER TOKENS
PROVIDER:[TOKEN:[[id number]],FOLLOWUP:[1 week],ESTABLISHEDPATIENT:[T]],PROVIDER:[TOKEN:[[id number]],FOLLOWUP:[1 week]]

## 2023-08-01 NOTE — PROGRESS NOTE ADULT - ATTENDING COMMENTS
I agree with the fellow's findings and plans as written above with the following additions/amendments:    Seen and examined at bedside, for discharge after PM lab draw with continued sCr improvement. No other issues, further recs as above

## 2023-08-01 NOTE — DISCHARGE NOTE PROVIDER - CARE PROVIDERS DIRECT ADDRESSES
[email or web address]

## 2023-08-01 NOTE — PROGRESS NOTE ADULT - SUBJECTIVE AND OBJECTIVE BOX
DIONI GOMEZ  57y  Male    Patient is a 57y old  Male who presents with a chief complaint of cardiac catheterization (01 Aug 2023 08:53)      INTERVAL HPI/OVERNIGHT EVENTS:    ROS: fever/chills, fatigue, nausea, vomiting, headache, stuffiness, sore throat, chest pain, palpitations, edema, SOB, cough, wheezing, changes in appetite, changes in bowel movements, contipation, diarrhea, abdominal pain, dizziness, fainting/LOC      T(C): 36.5 (08-01-23 @ 11:53), Max: 36.6 (08-01-23 @ 05:35)  HR: 52 (08-01-23 @ 11:53) (52 - 60)  BP: 135/71 (08-01-23 @ 11:53) (131/77 - 152/72)  RR: 16 (08-01-23 @ 11:53) (16 - 18)  SpO2: 98% (08-01-23 @ 11:53) (95% - 99%)  Wt(kg): --Vital Signs Last 24 Hrs  T(C): 36.5 (01 Aug 2023 11:53), Max: 36.6 (01 Aug 2023 05:35)  T(F): 97.7 (01 Aug 2023 11:53), Max: 97.8 (01 Aug 2023 05:35)  HR: 52 (01 Aug 2023 11:53) (52 - 60)  BP: 135/71 (01 Aug 2023 11:53) (131/77 - 152/72)  BP(mean): 104 (01 Aug 2023 08:47) (88 - 105)  RR: 16 (01 Aug 2023 11:53) (16 - 18)  SpO2: 98% (01 Aug 2023 11:53) (95% - 99%)    Parameters below as of 01 Aug 2023 11:53  Patient On (Oxygen Delivery Method): room air        PHYSICAL EXAM:  GENERAL: NAD; well-groomed; well-developed; AAO x 3; good concentration   Neuro: CN2-12 grossly intact; speech clear; +2/4 DTR in UE and LE b/l; light touch sensation intact of UE and le b/l;  negative Romberg test; no pronator drift; intact tandem gait; intact heel and toe walking; intact finger to nose testing; intact rapid alternating movements  HEENT: NC/AT; MMM; neck supple; good dentition; no nystagmus; no scleral icterus; nasal passages clear; no throid or LN enlargement  Heart: RRR; +s1 and s2; no MRG (or grade 2 _ murmur appreciated at _ ICS); non displaced PMI; no JVD  Lungs: CTA b/l; normal effort; no accesory muscle use; symmetric inhalation and exhalation; vesicular breath sounds; no WWR; normal tactile fremitus; persussion resonant  GI: nondistended; nontender; normal bowel sounds [id number]; percussion typmanic; no organomegaly   Extremities: +2 pulses in UE and LE b/l; no clubbing, cyanosis, or edema b/l, capillary refill <2 sec b/l  Skin: skin dry and warm; no skin tenting; no rashes or lesions  MSK: normal tone; +5/5 strength in upper and lower extremities b/l    Consultant(s) Notes Reviewed:  [x ] YES  [ ] NO  Care Discussed with Consultants/Other Providers [ x] YES  [ ] NO    LABS:                        13.9   8.81  )-----------( 211      ( 01 Aug 2023 07:24 )             41.6     08-01    142  |  109<H>  |  22  ----------------------------<  113<H>  3.5   |  21<L>  |  1.31<H>    Ca    8.6      01 Aug 2023 07:24  Mg     2.0     08-01    TPro  6.8  /  Alb  3.5  /  TBili  0.4  /  DBili  x   /  AST  17  /  ALT  17  /  AlkPhos  61  08-01      PT/INR - ( 31 Jul 2023 06:54 )   PT: 10.3 sec;   INR: 0.90          PTT - ( 31 Jul 2023 06:54 )  PTT:34.2 sec  Urinalysis Basic - ( 01 Aug 2023 07:24 )    Color: x / Appearance: x / SG: x / pH: x  Gluc: 113 mg/dL / Ketone: x  / Bili: x / Urobili: x   Blood: x / Protein: x / Nitrite: x   Leuk Esterase: x / RBC: x / WBC x   Sq Epi: x / Non Sq Epi: x / Bacteria: x      CAPILLARY BLOOD GLUCOSE            Urinalysis Basic - ( 01 Aug 2023 07:24 )    Color: x / Appearance: x / SG: x / pH: x  Gluc: 113 mg/dL / Ketone: x  / Bili: x / Urobili: x   Blood: x / Protein: x / Nitrite: x   Leuk Esterase: x / RBC: x / WBC x   Sq Epi: x / Non Sq Epi: x / Bacteria: x        MEDICATIONS  (STANDING):  amLODIPine   Tablet 5 milliGRAM(s) Oral daily  aspirin enteric coated 81 milliGRAM(s) Oral daily  atorvastatin 80 milliGRAM(s) Oral at bedtime  chlorhexidine 4% Liquid 1 Application(s) Topical once  clopidogrel Tablet 75 milliGRAM(s) Oral daily  hydrochlorothiazide 25 milliGRAM(s) Oral daily  losartan 100 milliGRAM(s) Oral daily  sodium chloride 0.9%. 1000 milliLiter(s) (100 mL/Hr) IV Continuous <Continuous>  sodium chloride 0.9%. 500 milliLiter(s) (250 mL/Hr) IV Continuous <Continuous>    MEDICATIONS  (PRN):      RADIOLOGY & ADDITIONAL TESTS:    Imaging Personally Reviewed:  [ ] YES  [ ] NO   DIONI GOMEZ  57y  Male    Patient is a 57y old  Male who presents with a chief complaint of cardiac catheterization (01 Aug 2023 08:53)      INTERVAL HPI/OVERNIGHT EVENTS:  Patient was seen and examined at bedside. Patient reports he was told he would be able to go home today after PM lab draw. Patient states he is urinating appropriately. Otherwise neg ROS    T(C): 36.5 (08-01-23 @ 11:53), Max: 36.6 (08-01-23 @ 05:35)  HR: 52 (08-01-23 @ 11:53) (52 - 60)  BP: 135/71 (08-01-23 @ 11:53) (131/77 - 152/72)  RR: 16 (08-01-23 @ 11:53) (16 - 18)  SpO2: 98% (08-01-23 @ 11:53) (95% - 99%)  Wt(kg): --Vital Signs Last 24 Hrs  T(C): 36.5 (01 Aug 2023 11:53), Max: 36.6 (01 Aug 2023 05:35)  T(F): 97.7 (01 Aug 2023 11:53), Max: 97.8 (01 Aug 2023 05:35)  HR: 52 (01 Aug 2023 11:53) (52 - 60)  BP: 135/71 (01 Aug 2023 11:53) (131/77 - 152/72)  BP(mean): 104 (01 Aug 2023 08:47) (88 - 105)  RR: 16 (01 Aug 2023 11:53) (16 - 18)  SpO2: 98% (01 Aug 2023 11:53) (95% - 99%)    Parameters below as of 01 Aug 2023 11:53  Patient On (Oxygen Delivery Method): room air        PHYSICAL EXAM:  GENERAL: NAD; well-groomed; well-developed; AAO x 3; good concentration   Neuro: CN2-12 grossly intact; speech clear; +2/4 DTR in UE and LE b/l; light touch sensation intact of UE and le b/l;  negative Romberg test; no pronator drift; intact tandem gait; intact heel and toe walking; intact finger to nose testing; intact rapid alternating movements  HEENT: NC/AT; MMM; neck supple; good dentition; no nystagmus; no scleral icterus; nasal passages clear; no throid or LN enlargement  Heart: RRR; +s1 and s2; no MRG (or grade 2 _ murmur appreciated at _ ICS); non displaced PMI; no JVD  Lungs: CTA b/l; normal effort; no accesory muscle use; symmetric inhalation and exhalation; vesicular breath sounds; no WWR; normal tactile fremitus; persussion resonant  GI: nondistended; nontender; normal bowel sounds [id number]; percussion typmanic; no organomegaly   Extremities: +2 pulses in UE and LE b/l; no clubbing, cyanosis, or edema b/l, capillary refill <2 sec b/l  Skin: skin dry and warm; no skin tenting; no rashes or lesions  MSK: normal tone; +5/5 strength in upper and lower extremities b/l    Consultant(s) Notes Reviewed:  [x ] YES  [ ] NO  Care Discussed with Consultants/Other Providers [ x] YES  [ ] NO    LABS:                        13.9   8.81  )-----------( 211      ( 01 Aug 2023 07:24 )             41.6     08-01    142  |  109<H>  |  22  ----------------------------<  113<H>  3.5   |  21<L>  |  1.31<H>    Ca    8.6      01 Aug 2023 07:24  Mg     2.0     08-01    TPro  6.8  /  Alb  3.5  /  TBili  0.4  /  DBili  x   /  AST  17  /  ALT  17  /  AlkPhos  61  08-01      PT/INR - ( 31 Jul 2023 06:54 )   PT: 10.3 sec;   INR: 0.90          PTT - ( 31 Jul 2023 06:54 )  PTT:34.2 sec  Urinalysis Basic - ( 01 Aug 2023 07:24 )    Color: x / Appearance: x / SG: x / pH: x  Gluc: 113 mg/dL / Ketone: x  / Bili: x / Urobili: x   Blood: x / Protein: x / Nitrite: x   Leuk Esterase: x / RBC: x / WBC x   Sq Epi: x / Non Sq Epi: x / Bacteria: x      CAPILLARY BLOOD GLUCOSE            Urinalysis Basic - ( 01 Aug 2023 07:24 )    Color: x / Appearance: x / SG: x / pH: x  Gluc: 113 mg/dL / Ketone: x  / Bili: x / Urobili: x   Blood: x / Protein: x / Nitrite: x   Leuk Esterase: x / RBC: x / WBC x   Sq Epi: x / Non Sq Epi: x / Bacteria: x        MEDICATIONS  (STANDING):  amLODIPine   Tablet 5 milliGRAM(s) Oral daily  aspirin enteric coated 81 milliGRAM(s) Oral daily  atorvastatin 80 milliGRAM(s) Oral at bedtime  chlorhexidine 4% Liquid 1 Application(s) Topical once  clopidogrel Tablet 75 milliGRAM(s) Oral daily  hydrochlorothiazide 25 milliGRAM(s) Oral daily  losartan 100 milliGRAM(s) Oral daily  sodium chloride 0.9%. 1000 milliLiter(s) (100 mL/Hr) IV Continuous <Continuous>  sodium chloride 0.9%. 500 milliLiter(s) (250 mL/Hr) IV Continuous <Continuous>    MEDICATIONS  (PRN):      RADIOLOGY & ADDITIONAL TESTS:    Imaging Personally Reviewed:  [ ] YES  [ ] NO   DINOI GOMEZ  57y  Male    Patient is a 57y old  Male who presents with a chief complaint of cardiac catheterization (01 Aug 2023 08:53)      INTERVAL HPI/OVERNIGHT EVENTS:  Patient was seen and examined at bedside. Patient reports he was told he would be able to go home today after PM lab draw. Patient states he is urinating appropriately. Otherwise neg ROS    T(C): 36.5 (08-01-23 @ 11:53), Max: 36.6 (08-01-23 @ 05:35)  HR: 52 (08-01-23 @ 11:53) (52 - 60)  BP: 135/71 (08-01-23 @ 11:53) (131/77 - 152/72)  RR: 16 (08-01-23 @ 11:53) (16 - 18)  SpO2: 98% (08-01-23 @ 11:53) (95% - 99%)  Wt(kg): --Vital Signs Last 24 Hrs  T(C): 36.5 (01 Aug 2023 11:53), Max: 36.6 (01 Aug 2023 05:35)  T(F): 97.7 (01 Aug 2023 11:53), Max: 97.8 (01 Aug 2023 05:35)  HR: 52 (01 Aug 2023 11:53) (52 - 60)  BP: 135/71 (01 Aug 2023 11:53) (131/77 - 152/72)  BP(mean): 104 (01 Aug 2023 08:47) (88 - 105)  RR: 16 (01 Aug 2023 11:53) (16 - 18)  SpO2: 98% (01 Aug 2023 11:53) (95% - 99%)    Parameters below as of 01 Aug 2023 11:53  Patient On (Oxygen Delivery Method): room air        PHYSICAL EXAM:  GENERAL: NAD;  good concentration   Neuro: AAOx3; speech clear  HEENT: NC/AT; MMM; no nystagmus  Heart: RRR; +s1 and s2; no MRG  Lungs: CTA b/l; normal effort; no accessory muscle use; symmetric inhalation and exhalation; no WWR  GI: nondistended; nontender; normal bowel sounds [id number]  Extremities: +2 pulses in UE and LE b/l; no clubbing, cyanosis, or edema b/l, capillary refill <2 sec b/l  Skin: skin dry and warm; no skin tenting; no rashes or lesions  MSK: normal tone    Consultant(s) Notes Reviewed:  [x ] YES  [ ] NO  Care Discussed with Consultants/Other Providers [ x] YES  [ ] NO    LABS:                        13.9   8.81  )-----------( 211      ( 01 Aug 2023 07:24 )             41.6     08-01    142  |  109<H>  |  22  ----------------------------<  113<H>  3.5   |  21<L>  |  1.31<H>    Ca    8.6      01 Aug 2023 07:24  Mg     2.0     08-01    TPro  6.8  /  Alb  3.5  /  TBili  0.4  /  DBili  x   /  AST  17  /  ALT  17  /  AlkPhos  61  08-01      PT/INR - ( 31 Jul 2023 06:54 )   PT: 10.3 sec;   INR: 0.90          PTT - ( 31 Jul 2023 06:54 )  PTT:34.2 sec  Urinalysis Basic - ( 01 Aug 2023 07:24 )    Color: x / Appearance: x / SG: x / pH: x  Gluc: 113 mg/dL / Ketone: x  / Bili: x / Urobili: x   Blood: x / Protein: x / Nitrite: x   Leuk Esterase: x / RBC: x / WBC x   Sq Epi: x / Non Sq Epi: x / Bacteria: x      CAPILLARY BLOOD GLUCOSE            Urinalysis Basic - ( 01 Aug 2023 07:24 )    Color: x / Appearance: x / SG: x / pH: x  Gluc: 113 mg/dL / Ketone: x  / Bili: x / Urobili: x   Blood: x / Protein: x / Nitrite: x   Leuk Esterase: x / RBC: x / WBC x   Sq Epi: x / Non Sq Epi: x / Bacteria: x        MEDICATIONS  (STANDING):  amLODIPine   Tablet 5 milliGRAM(s) Oral daily  aspirin enteric coated 81 milliGRAM(s) Oral daily  atorvastatin 80 milliGRAM(s) Oral at bedtime  chlorhexidine 4% Liquid 1 Application(s) Topical once  clopidogrel Tablet 75 milliGRAM(s) Oral daily  hydrochlorothiazide 25 milliGRAM(s) Oral daily  losartan 100 milliGRAM(s) Oral daily  sodium chloride 0.9%. 1000 milliLiter(s) (100 mL/Hr) IV Continuous <Continuous>  sodium chloride 0.9%. 500 milliLiter(s) (250 mL/Hr) IV Continuous <Continuous>    MEDICATIONS  (PRN):      RADIOLOGY & ADDITIONAL TESTS:    Imaging Personally Reviewed:  [ ] YES  [ ] NO

## 2023-08-01 NOTE — DISCHARGE NOTE PROVIDER - HOSPITAL COURSE
57-year-old male, current smoker (45 pack years), PMHx HTN, HLD, CAD (s/p recent PCI 6/20/23: PTCA/KRUPA x1 mRCA; LCx/OM1 bifurcation 80%), TIA (on aspirin, statin) who originally presented to their outpatient cardiologist Dr. Aruna Crow complaining of SSCP w/ WHITE upon climbing mild incline. Patient underwent [facility] w/ PCI on 6/20/23 with residual disease in LCx/OM1 bifurcation with plan for staging. Since procedure, patient reports feeling much better, with drastic improvement in his substernal chest pain, but does continue to experience chest pain and SOB with stairs and endorses strict adherence to DAPT therapy as prescribed. Patient denies: dizziness, palpitations, orthopnea/PND, leg swelling, LOC, bleeding, melena/hematochezia, fever, chills, URI symptoms, or recent illness.  In light of patient's risk factors, continued CCS III anginal-equivalent symptoms and known residual LCx/OM1 disease, pt presented to [facility] for staged PCI. Is now s/p LHC:  IVUS-guided KRUPA x 1 mOM1/LCx; LM: normal, mLAD: 30%, LCx/OM1: 90%, RCA: mid stent patent; no edp/EF; ACCESS: R groin PC. Patient was followed by renal during admission due to sCr elevated. Recs: continue post cath hydration per protocol and follow up with a 12pm BMP prior to discharge. If sCr continues to decrease, stable for d/c with outpt renal follow up.      Cardiac catheterization (6/20/23): PTCA/KRUPA x 1 (Synergy 32 x 3 mm) mid RCA, and other findings LM normal, mid LAD 30%, D1 50-60%, LCx/OM1 bifurcation 80%, proximal RCA 30%, distal RCA 30%, EDP 12 mmHg. Right radial access.     CCTA (5/1/23): Ca score 103, severe mixed disease in the mLCx, severe mixed disease in the mid RCA, mild-mod mixed disease in the D1, mod mixed in the ramus intermedius, which is a small caliber vessel, remaining coronary artery segments appear non-obstructive.    TTE (4/5/23): LVEF 55-60%, mild concentric LVH, G1DD, mild AV sclerosis.     Pt is asymptomatic at this time and denies chest pain, SOB, WHITE, palpitations, dizziness, LOC, N/V, diaphoresis, orthopnea/PND, and leg swelling. Pt able to ambulate and void without complication. VSS. Am K+_ 3.5-- repleated. REPEAT 12 pm: ________ Labs and telemetry reviewed. R groin access site stable and dressing C/D/I.  Pt is a candidate for discharge per Dr. Eason. Pt given appropriate discharge instructions, pt states they have an appropriate amount of their previous home meds unchanged from this visit at home, and any new medications were sent to their pharmacy. Pt instructed to f/u with Dr. Shirley in 1-2 weeks and follow up with [facility] in 1-2 weeks.      57-year-old male, current smoker (45 pack years), PMHx HTN, HLD, CAD (s/p recent PCI 6/20/23: PTCA/KRUPA x1 mRCA; LCx/OM1 bifurcation 80%), TIA (on aspirin, statin) who originally presented to their outpatient cardiologist Dr. Aruna Crow complaining of SSCP w/ WHITE upon climbing mild incline. Patient underwent [facility] w/ PCI on 6/20/23 with residual disease in LCx/OM1 bifurcation with plan for staging. Since procedure, patient reports feeling much better, with drastic improvement in his substernal chest pain, but does continue to experience chest pain and SOB with stairs and endorses strict adherence to DAPT therapy as prescribed. Patient denies: dizziness, palpitations, orthopnea/PND, leg swelling, LOC, bleeding, melena/hematochezia, fever, chills, URI symptoms, or recent illness.  In light of patient's risk factors, continued CCS III anginal-equivalent symptoms and known residual LCx/OM1 disease, pt presented to [facility] for staged PCI. Is now s/p LHC:  IVUS-guided KRUPA x 1 mOM1/LCx; LM: normal, mLAD: 30%, LCx/OM1: 90%, RCA: mid stent patent; no edp/EF; ACCESS: R groin PC. Patient was followed by renal during admission due to sCr elevated. Recs: continue post cath hydration per protocol and follow up with a 12pm BMP prior to discharge. If sCr continues to decrease, stable for d/c with outpt renal follow up.      Cardiac catheterization (6/20/23): PTCA/KRUPA x 1 (Synergy 32 x 3 mm) mid RCA, and other findings LM normal, mid LAD 30%, D1 50-60%, LCx/OM1 bifurcation 80%, proximal RCA 30%, distal RCA 30%, EDP 12 mmHg. Right radial access.     CCTA (5/1/23): Ca score 103, severe mixed disease in the mLCx, severe mixed disease in the mid RCA, mild-mod mixed disease in the D1, mod mixed in the ramus intermedius, which is a small caliber vessel, remaining coronary artery segments appear non-obstructive.    TTE (4/5/23): LVEF 55-60%, mild concentric LVH, G1DD, mild AV sclerosis.     Pt is asymptomatic at this time and denies chest pain, SOB, WHITE, palpitations, dizziness, LOC, N/V, diaphoresis, orthopnea/PND, and leg swelling. Pt able to ambulate and void without complication. VSS. Am K+_ 3.5-- repleated. Repeat BMP @12 pm today wnl with sCr 1.28. Labs and telemetry reviewed. R groin access site stable and dressing C/D/I.  Pt is a candidate for discharge per Dr. Eason. Pt given appropriate discharge instructions, pt states they have an appropriate amount of their previous home meds unchanged from this visit at home, and any new medications were sent to their pharmacy. Pt instructed to f/u with Dr. Shirley in 1-2 weeks and follow up with [facility] in 1-2 weeks.     Dx: POST PCI. Patient to follow-up with: Dr. Shirley. Patient been educated on benefits of cardiac rehab. Patient given referral and rx for cardiac rehab with a list of locations and instructions to contact their insurance company to review list of participating providers. Patient prescribed statin:   Atorvastatin 80mg . Patient also prescribed DAPT: ASA 81mg and Plavix 75mg. Sent to patient's pharmacy with refills.

## 2023-08-01 NOTE — DISCHARGE NOTE PROVIDER - CARE PROVIDER_API CALL
Kenneth Shirley  Cardiology  [address], [address] [place] [address]  Phone: [phone]  Fax: [phone]  Established Patient  Follow Up Time: 1 week    Alexander Diaz  Nephrology  [address]  [place] [address]  Phone: [phone]  Fax: [phone]  Follow Up Time: 1 week

## 2023-08-03 DIAGNOSIS — E78.5 HYPERLIPIDEMIA, UNSPECIFIED: ICD-10-CM

## 2023-08-03 DIAGNOSIS — Z79.02 LONG TERM (CURRENT) USE OF ANTITHROMBOTICS/ANTIPLATELETS: ICD-10-CM

## 2023-08-03 DIAGNOSIS — Z91.013 ALLERGY TO SEAFOOD: ICD-10-CM

## 2023-08-03 DIAGNOSIS — E86.1 HYPOVOLEMIA: ICD-10-CM

## 2023-08-03 DIAGNOSIS — F17.210 NICOTINE DEPENDENCE, CIGARETTES, UNCOMPLICATED: ICD-10-CM

## 2023-08-03 DIAGNOSIS — I10 ESSENTIAL (PRIMARY) HYPERTENSION: ICD-10-CM

## 2023-08-03 DIAGNOSIS — I25.119 ATHEROSCLEROTIC HEART DISEASE OF NATIVE CORONARY ARTERY WITH UNSPECIFIED ANGINA PECTORIS: ICD-10-CM

## 2023-08-03 DIAGNOSIS — Z79.82 LONG TERM (CURRENT) USE OF ASPIRIN: ICD-10-CM

## 2023-08-03 DIAGNOSIS — Z86.73 PERSONAL HISTORY OF TRANSIENT ISCHEMIC ATTACK (TIA), AND CEREBRAL INFARCTION WITHOUT RESIDUAL DEFICITS: ICD-10-CM

## 2023-08-03 DIAGNOSIS — I25.10 ATHEROSCLEROTIC HEART DISEASE OF NATIVE CORONARY ARTERY WITHOUT ANGINA PECTORIS: ICD-10-CM

## 2023-08-03 DIAGNOSIS — N17.9 ACUTE KIDNEY FAILURE, UNSPECIFIED: ICD-10-CM

## 2023-08-03 DIAGNOSIS — Z95.5 PRESENCE OF CORONARY ANGIOPLASTY IMPLANT AND GRAFT: ICD-10-CM

## 2023-11-16 PROBLEM — I25.10 ATHEROSCLEROTIC HEART DISEASE OF NATIVE CORONARY ARTERY WITHOUT ANGINA PECTORIS: Chronic | Status: ACTIVE | Noted: 2023-07-27

## 2023-11-18 ENCOUNTER — APPOINTMENT (OUTPATIENT)
Dept: DERMATOLOGY | Facility: CLINIC | Age: 58
End: 2023-11-18
Payer: COMMERCIAL

## 2023-11-18 VITALS — BODY MASS INDEX: 25.11 KG/M2 | WEIGHT: 160 LBS | HEIGHT: 67 IN

## 2023-11-18 PROCEDURE — 96372 THER/PROPH/DIAG INJ SC/IM: CPT

## 2023-11-18 PROCEDURE — 99204 OFFICE O/P NEW MOD 45 MIN: CPT | Mod: 25

## 2023-12-03 ENCOUNTER — NON-APPOINTMENT (OUTPATIENT)
Age: 58
End: 2023-12-03

## 2023-12-04 ENCOUNTER — APPOINTMENT (OUTPATIENT)
Dept: DERMATOLOGY | Facility: CLINIC | Age: 58
End: 2023-12-04
Payer: COMMERCIAL

## 2023-12-04 PROCEDURE — 96372 THER/PROPH/DIAG INJ SC/IM: CPT

## 2023-12-04 PROCEDURE — 99214 OFFICE O/P EST MOD 30 MIN: CPT | Mod: 25

## 2023-12-04 RX ORDER — FLUOCINONIDE 0.05 MG/G
0.05 OINTMENT TOPICAL
Qty: 1 | Refills: 1 | Status: ACTIVE | COMMUNITY
Start: 2023-12-04 | End: 1900-01-01

## 2023-12-06 ENCOUNTER — NON-APPOINTMENT (OUTPATIENT)
Age: 58
End: 2023-12-06

## 2023-12-28 ENCOUNTER — APPOINTMENT (OUTPATIENT)
Dept: INFECTIOUS DISEASE | Facility: CLINIC | Age: 58
End: 2023-12-28
Payer: COMMERCIAL

## 2023-12-28 VITALS
DIASTOLIC BLOOD PRESSURE: 87 MMHG | HEIGHT: 67 IN | HEART RATE: 61 BPM | WEIGHT: 161 LBS | SYSTOLIC BLOOD PRESSURE: 135 MMHG | BODY MASS INDEX: 25.27 KG/M2 | TEMPERATURE: 97.4 F | OXYGEN SATURATION: 100 %

## 2023-12-28 PROCEDURE — 99203 OFFICE O/P NEW LOW 30 MIN: CPT

## 2023-12-28 NOTE — PHYSICAL EXAM
[General Appearance - Alert] : alert [General Appearance - In No Acute Distress] : in no acute distress [Sclera] : the sclera and conjunctiva were normal [PERRL With Normal Accommodation] : pupils were equal in size, round, reactive to light [Extraocular Movements] : extraocular movements were intact [Neck Appearance] : the appearance of the neck was normal [Neck Cervical Mass (___cm)] : no neck mass was observed [Jugular Venous Distention Increased] : there was no jugular-venous distention [Thyroid Diffuse Enlargement] : the thyroid was not enlarged [Auscultation Breath Sounds / Voice Sounds] : lungs were clear to auscultation bilaterally [Heart Rate And Rhythm] : heart rate was normal and rhythm regular [Heart Sounds] : normal S1 and S2 [Heart Sounds Gallop] : no gallops [Murmurs] : no murmurs [Heart Sounds Pericardial Friction Rub] : no pericardial rub [Full Pulse] : the pedal pulses are present [Edema] : there was no peripheral edema [Bowel Sounds] : normal bowel sounds [Abdomen Soft] : soft [Abdomen Tenderness] : non-tender [Abdomen Mass (___ Cm)] : no abdominal mass palpated [Costovertebral Angle Tenderness] : no CVA tenderness [No Palpable Adenopathy] : no palpable adenopathy [Musculoskeletal - Swelling] : no joint swelling [Nail Clubbing] : no clubbing  or cyanosis of the fingernails [Motor Tone] : muscle strength and tone were normal [] : no rash [Skin Color & Pigmentation] : normal skin color and pigmentation [FreeTextEntry1] : Falky skin in legs, no cellulitis or open wound [Deep Tendon Reflexes (DTR)] : deep tendon reflexes were 2+ and symmetric [Sensation] : the sensory exam was normal to light touch and pinprick [No Focal Deficits] : no focal deficits [Oriented To Time, Place, And Person] : oriented to person, place, and time [Affect] : the affect was normal

## 2023-12-28 NOTE — HISTORY OF PRESENT ILLNESS
[FreeTextEntry1] : trenton Simmons   # [id number]  59yo man with PMH Of HTN and psoriasis was referred by dermatology with a positive Quantiferon test.  He is from [place] and not sure if had PPD or a positive test in the past. He is asymptomatic with no cough, weight loss, night sweats, fever or loss of appetite. He is a heavy smoker but no drinking or drugs.  Came from [place] years ago. NO recnet travel.  No TB contact as far as he knows.  NO chest xray done recently.

## 2023-12-28 NOTE — ASSESSMENT
[FreeTextEntry1] : 59yo man with Psoriasis will be on biologics, had QuantiFERON that was positive.  Will do CXR to make sure no lesions or infection (heavy smoker might need CT if CXR abnormal). Will start INH and Vit B6 for 9months if CXR negative. - HIV test  Discussed about side effects especially GI and hepatic, liver failure and jaundice.  will see him in one month  Patient was given the opportunity to ask questions and all questions were answered to their satisfaction. Counseling included lab results, differential diagnosis, treatment options, risks and benefits, lifestyle changes, current condition, medications and dose adjustments. The patient verbalized understanding. [Treatment Education] : treatment education [Treatment Adherence] : treatment adherence [Rx Dose / Side Effects] : Rx dose/side effects [Risk Reduction] : risk reduction [Universal Precautions] : universal precautions [Drug Interactions / Side Effects] : drug interactions/side effects [Anticipatory Guidance] : anticipatory guidance

## 2023-12-29 ENCOUNTER — NON-APPOINTMENT (OUTPATIENT)
Age: 58
End: 2023-12-29

## 2023-12-31 LAB
ALBUMIN SERPL ELPH-MCNC: 4.7 G/DL
ALP BLD-CCNC: 72 U/L
ALT SERPL-CCNC: 20 U/L
ANION GAP SERPL CALC-SCNC: 13 MMOL/L
AST SERPL-CCNC: 17 U/L
BASOPHILS # BLD AUTO: 0.07 K/UL
BASOPHILS NFR BLD AUTO: 0.8 %
BILIRUB SERPL-MCNC: 0.5 MG/DL
BUN SERPL-MCNC: 29 MG/DL
CALCIUM SERPL-MCNC: 9.8 MG/DL
CHLORIDE SERPL-SCNC: 100 MMOL/L
CO2 SERPL-SCNC: 24 MMOL/L
CREAT SERPL-MCNC: 1.28 MG/DL
EGFR: 64 ML/MIN/1.73M2
EOSINOPHIL # BLD AUTO: 0.68 K/UL
EOSINOPHIL NFR BLD AUTO: 7.6 %
GLUCOSE SERPL-MCNC: 119 MG/DL
HBV CORE IGG+IGM SER QL: NONREACTIVE
HBV SURFACE AB SER QL: NONREACTIVE
HBV SURFACE AG SER QL: NONREACTIVE
HCT VFR BLD CALC: 50.7 %
HCV AB SER QL: NONREACTIVE
HCV S/CO RATIO: 0.06 S/CO
HGB BLD-MCNC: 16.5 G/DL
IMM GRANULOCYTES NFR BLD AUTO: 0.4 %
LYMPHOCYTES # BLD AUTO: 2.44 K/UL
LYMPHOCYTES NFR BLD AUTO: 27.1 %
M TB IFN-G BLD-IMP: POSITIVE
MAN DIFF?: NORMAL
MCHC RBC-ENTMCNC: 30.5 PG
MCHC RBC-ENTMCNC: 32.5 GM/DL
MCV RBC AUTO: 93.7 FL
MONOCYTES # BLD AUTO: 0.9 K/UL
MONOCYTES NFR BLD AUTO: 10 %
NEUTROPHILS # BLD AUTO: 4.87 K/UL
NEUTROPHILS NFR BLD AUTO: 54.1 %
PLATELET # BLD AUTO: 280 K/UL
POTASSIUM SERPL-SCNC: 3.8 MMOL/L
PROT SERPL-MCNC: 8 G/DL
QUANTIFERON TB PLUS MITOGEN MINUS NIL: >10 IU/ML
QUANTIFERON TB PLUS NIL: 0.07 IU/ML
QUANTIFERON TB PLUS TB1 MINUS NIL: 0.73 IU/ML
QUANTIFERON TB PLUS TB2 MINUS NIL: 0.83 IU/ML
RBC # BLD: 5.41 M/UL
RBC # FLD: 13.2 %
SODIUM SERPL-SCNC: 138 MMOL/L
WBC # FLD AUTO: 9 K/UL

## 2024-01-12 ENCOUNTER — NON-APPOINTMENT (OUTPATIENT)
Age: 59
End: 2024-01-12

## 2024-02-08 ENCOUNTER — APPOINTMENT (OUTPATIENT)
Dept: INFECTIOUS DISEASE | Facility: CLINIC | Age: 59
End: 2024-02-08
Payer: COMMERCIAL

## 2024-02-08 PROCEDURE — 99213 OFFICE O/P EST LOW 20 MIN: CPT

## 2024-02-08 RX ORDER — PYRIDOXINE HCL (VITAMIN B6) 25 MG
25 TABLET ORAL DAILY
Qty: 30 | Refills: 2 | Status: ACTIVE | COMMUNITY
Start: 2023-12-28 | End: 1900-01-01

## 2024-02-08 RX ORDER — ISONIAZID 300 MG/1
300 TABLET ORAL
Qty: 30 | Refills: 2 | Status: ACTIVE | COMMUNITY
Start: 2023-12-28 | End: 1900-01-01

## 2024-02-08 NOTE — ASSESSMENT
[FreeTextEntry1] : 59yo man is here for LTBI treatment follow up. He is on INH and vitB6, 9months plan.  No side effects, no GI symptoms, no jaundice. No other complaint. No cough, SOB or chest pain, no fever or night sweats. Normal appetite.  Will continue INH and VIt [id number] RTC 3months   Patient was given the opportunity to ask questions and all questions were answered to their satisfaction. Counseling included lab results, differential diagnosis, treatment options, risks and benefits, lifestyle changes, current condition, medications and dose adjustments. The patient verbalized understanding.  [Treatment Education] : treatment education [Treatment Adherence] : treatment adherence [Rx Dose / Side Effects] : Rx dose/side effects [Risk Reduction] : risk reduction [Universal Precautions] : universal precautions [Drug Interactions / Side Effects] : drug interactions/side effects [Anticipatory Guidance] : anticipatory guidance

## 2024-02-08 NOTE — HISTORY OF PRESENT ILLNESS
[FreeTextEntry1] : 59yo man with PMH Of HTN and psoriasis was referred by dermatology with a positive Quantiferon test.  He is from [place] and not sure if had PPD or a positive test in the past. He is asymptomatic with no cough, weight loss, night sweats, fever or loss of appetite. He is a heavy smoker but no drinking or drugs.  Came from [place] years ago. NO recent travel.  No TB contact as far as he knows.  Chest X ray negative  Decided to start on INH+vitB6 for 9months in last visit.  NO side effects, no GI or CNS side effects.

## 2024-02-15 ENCOUNTER — NON-APPOINTMENT (OUTPATIENT)
Age: 59
End: 2024-02-15

## 2024-02-19 ENCOUNTER — APPOINTMENT (OUTPATIENT)
Dept: RADIOLOGY | Facility: CLINIC | Age: 59
End: 2024-02-19
Payer: COMMERCIAL

## 2024-02-19 PROCEDURE — 71046 X-RAY EXAM CHEST 2 VIEWS: CPT

## 2024-03-08 NOTE — DISCHARGE NOTE PROVIDER - NSDCQMERRANDS_GEN_ALL_CORE
Goal Outcome Evaluation:  Plan of Care Reviewed With: patient        Progress: improving  Outcome Evaluation: patient alert and oriented family at bedside, voiding spontaneously, ambulates with standby assist, IV saline lock on room air, VSS report to Thomas                                No

## 2024-05-02 ENCOUNTER — APPOINTMENT (OUTPATIENT)
Dept: INFECTIOUS DISEASE | Facility: CLINIC | Age: 59
End: 2024-05-02
Payer: COMMERCIAL

## 2024-05-02 VITALS
DIASTOLIC BLOOD PRESSURE: 82 MMHG | WEIGHT: 162 LBS | BODY MASS INDEX: 25.43 KG/M2 | HEIGHT: 67 IN | SYSTOLIC BLOOD PRESSURE: 132 MMHG | TEMPERATURE: 97.8 F | OXYGEN SATURATION: 98 % | HEART RATE: 64 BPM

## 2024-05-02 PROCEDURE — 99213 OFFICE O/P EST LOW 20 MIN: CPT

## 2024-05-02 NOTE — PHYSICAL EXAM
[General Appearance - Alert] : alert [General Appearance - In No Acute Distress] : in no acute distress [Sclera] : the sclera and conjunctiva were normal [PERRL With Normal Accommodation] : pupils were equal in size, round, reactive to light [Extraocular Movements] : extraocular movements were intact [Outer Ear] : the ears and nose were normal in appearance [Oropharynx] : the oropharynx was normal with no thrush [Neck Appearance] : the appearance of the neck was normal [Neck Cervical Mass (___cm)] : no neck mass was observed [Jugular Venous Distention Increased] : there was no jugular-venous distention [Thyroid Diffuse Enlargement] : the thyroid was not enlarged [Auscultation Breath Sounds / Voice Sounds] : lungs were clear to auscultation bilaterally [Heart Rate And Rhythm] : heart rate was normal and rhythm regular [Heart Sounds] : normal S1 and S2 [Heart Sounds Gallop] : no gallops [Murmurs] : no murmurs [Heart Sounds Pericardial Friction Rub] : no pericardial rub [Full Pulse] : the pedal pulses are present [Edema] : there was no peripheral edema [Bowel Sounds] : normal bowel sounds [Abdomen Soft] : soft [Abdomen Tenderness] : non-tender [] : no hepato-splenomegaly [Abdomen Mass (___ Cm)] : no abdominal mass palpated [Costovertebral Angle Tenderness] : no CVA tenderness [No Palpable Adenopathy] : no palpable adenopathy [Musculoskeletal - Swelling] : no joint swelling [Nail Clubbing] : no clubbing  or cyanosis of the fingernails [Motor Tone] : muscle strength and tone were normal [FreeTextEntry1] : Pseoriasis dry patches on skin, left side of abdomen [Deep Tendon Reflexes (DTR)] : deep tendon reflexes were 2+ and symmetric [Sensation] : the sensory exam was normal to light touch and pinprick [No Focal Deficits] : no focal deficits [Oriented To Time, Place, And Person] : oriented to person, place, and time [Affect] : the affect was normal

## 2024-05-02 NOTE — HISTORY OF PRESENT ILLNESS
[FreeTextEntry1] : Chad  # [id number]  57yo man with PMH Of HTN and psoriasis was referred by dermatology with a positive Quantiferon test.  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  He is asymptomatic with no cough, weight loss, night sweats, fever or loss of appetite.  He is a heavy smoker but no drinking or drugs.  NO recent travel.  No TB contact as far as he knows.  Chest X ray negative  His PCP is Dr. Mosquera who know this patient very well and follows him for yeras.  He has been diagnosed with LTBI in 2015 and completed 9 months of INH at that time.  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  
WDL

## 2024-05-02 NOTE — ASSESSMENT
[FreeTextEntry1] : 57yo man with PMH Of HTN and psoriasis was referred by dermatology with a positive Quantiferon test.  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  He is asymptomatic with no cough, weight loss, night sweats, fever or loss of appetite.  He is a heavy smoker but no drinking or drugs.  NO recent travel.  No TB contact as far as he knows.  Chest X ray negative  His PCP is Dr. Mosquera who know this patient very well and follows him for yeras.  He has been diagnosed with LTBI in 2015 and completed 9 months of INH at that time.   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 so need to repeat the treatment.  Can proceed with treatment with biologics for psoriasis. Will monitor clinically with symptoms for active TB.  Follow up with Dr. Mosquera for annual chest CT for cancer screening due to heavy smoking history.  Smoking cessation discussed.  RTC PRN  Patient was given the opportunity to ask questions and all questions were answered to their satisfaction. Counseling included lab results, differential diagnosis, treatment options, risks and benefits, lifestyle changes, current condition, medications and dose adjustments. The patient verbalized understanding. [Treatment Education] : treatment education [Risk Reduction] : risk reduction [Universal Precautions] : universal precautions [Anticipatory Guidance] : anticipatory guidance

## 2024-05-07 ENCOUNTER — APPOINTMENT (OUTPATIENT)
Dept: DERMATOLOGY | Facility: CLINIC | Age: 59
End: 2024-05-07
Payer: COMMERCIAL

## 2024-05-07 PROCEDURE — 99214 OFFICE O/P EST MOD 30 MIN: CPT

## 2024-05-14 ENCOUNTER — NON-APPOINTMENT (OUTPATIENT)
Age: 59
End: 2024-05-14

## 2024-05-21 RX ORDER — IXEKIZUMAB 80 MG/ML
80 INJECTION, SOLUTION SUBCUTANEOUS
Qty: 2 | Refills: 2 | Status: ACTIVE | COMMUNITY
Start: 2023-11-21

## 2024-06-14 ENCOUNTER — APPOINTMENT (OUTPATIENT)
Dept: DERMATOLOGY | Facility: CLINIC | Age: 59
End: 2024-06-14
Payer: COMMERCIAL

## 2024-06-14 DIAGNOSIS — L40.9 PSORIASIS, UNSPECIFIED: ICD-10-CM

## 2024-06-14 DIAGNOSIS — Z79.899 OTHER LONG TERM (CURRENT) DRUG THERAPY: ICD-10-CM

## 2024-06-14 DIAGNOSIS — Z22.7 LATENT TUBERCULOSIS: ICD-10-CM

## 2024-06-14 PROCEDURE — 99214 OFFICE O/P EST MOD 30 MIN: CPT

## 2024-06-14 NOTE — HISTORY OF PRESENT ILLNESS
[FreeTextEntry1] : f/u - psoriasis [de-identified] : 57 y/o M here for psoriasis f/u, last seen 5/7/24 [facility]  [id number] - has failed Ilumya and methotrexate - taltz injected in office 11/18/2023 and 12/4/2023; then stopped as he had a positive TB test--referred to ID who cleared him to restart taltz (he had LTBI 2015 and received 9 mos INH therapy afterward) - today with few plaques on the abdomen and lower legs, reports this is sig improved even after the 2 taltz injections--would like to restart - denies joint pains/stiffness today - restarted Taltz 80mg sc on 6/4, no issues with injection

## 2024-06-14 NOTE — ASSESSMENT
[FreeTextEntry1] : #Psoriasis, chronic, flare, moderate -He has failed MTX, Ilumya -Prefers to avoid topicals -RESTART taltz 80mg/ml--1 syringe q2 weeks until week 12, then 1 injection every month. Reviewed Taltz AEs including worsening of colitis, infection, fungal infections.  Restarted 6/4 - to continue q2 weeks until week 12, then every 4 weeks thereafter.  #high risk med use--taltz #Hx of latent TB -s/p 9 mos of INH in 2015 - reviewed CBC, CMP, hep serologies, quant 12/2023--quant will remain positive given his hx of TB; cleared by ID to start taltz pt to f/up with his PCP Dr. Mosquera    RTC 2 months

## 2024-06-14 NOTE — PHYSICAL EXAM
[Alert] : alert [Oriented x 3] : ~L oriented x 3 [Declined] : declined [FreeTextEntry3] : Focused skin exam per pt preference well demarcated scaly pink plaques on elbows

## 2024-08-12 ENCOUNTER — APPOINTMENT (OUTPATIENT)
Dept: DERMATOLOGY | Facility: CLINIC | Age: 59
End: 2024-08-12
Payer: COMMERCIAL

## 2024-08-12 DIAGNOSIS — Z79.899 OTHER LONG TERM (CURRENT) DRUG THERAPY: ICD-10-CM

## 2024-08-12 DIAGNOSIS — L40.9 PSORIASIS, UNSPECIFIED: ICD-10-CM

## 2024-08-12 DIAGNOSIS — Z22.7 LATENT TUBERCULOSIS: ICD-10-CM

## 2024-08-12 PROCEDURE — 99214 OFFICE O/P EST MOD 30 MIN: CPT

## 2024-08-12 RX ORDER — IXEKIZUMAB 80 MG/ML
80 INJECTION, SOLUTION SUBCUTANEOUS
Qty: 1 | Refills: 5 | Status: ACTIVE | COMMUNITY
Start: 2024-08-12 | End: 1900-01-01

## 2024-08-12 NOTE — PHYSICAL EXAM
[Alert] : alert [Oriented x 3] : ~L oriented x 3 [Declined] : declined [FreeTextEntry3] : Focused skin exam performed  The relevant portions of the exam were performed today  AAOx3, NAD, well-appearing / pleasant Focused examination within normal limits with the exception of: - well demarcated thin scaly pink plaques on lower legs - solitary thin well demarcated pink plaque on lower abdomen

## 2024-08-12 NOTE — ASSESSMENT
[FreeTextEntry1] : #Psoriasis, chronic, flare, moderate -He has failed MTX, Ilumya -Prefers to avoid topicals -c/w taltz 80mg/ml every 4 weeks (maintenance dose); patient endorses he last took Taltz 80mg 7/16 and is here for refills of medication --Restarted 6/4 - to continue 80mg sc every 4 weeks. Reviewed Taltz AEs including worsening of colitis, infection, fungal infections. If recalcitrant, can consider increasing frequency to every 2 weeks.  #high risk med use--taltz #Hx of latent TB -s/p 9 mos of INH in 2015 - reviewed CBC, CMP, hep serologies, quant 12/2023--quant will remain positive given his hx of TB; cleared by ID to start taltz pt to f/up with his PCP Dr. Mosquera   RTC 4-6 months Chad  #[id number]

## 2024-08-12 NOTE — HISTORY OF PRESENT ILLNESS
[FreeTextEntry1] : f/u - psoriasis [de-identified] : 57 y/o M here for psoriasis f/u, last seen 6/14/24 [facility]  [id number] - has failed Ilumya and methotrexate - taltz injected in office 11/18/2023 and 12/4/2023; then stopped as he had a positive TB test--referred to ID who cleared him to restart taltz (he had LTBI 2015 and received 9 mos INH therapy afterward) - patient endorses significant improvement with taltz in the past, even after 2 injections - Denies excess fatigue, eye sxs, GI distress, joint pain, or stiffness - restarted Taltz 80mg sc on 6/4, no issues with injection - endorses improvement in psoriasis while on Taltz, but ran out of medication in mid-July, last dose 7/16. Endorses new spot forming on abdomen.

## 2024-10-14 ENCOUNTER — APPOINTMENT (OUTPATIENT)
Dept: DERMATOLOGY | Facility: CLINIC | Age: 59
End: 2024-10-14
Payer: COMMERCIAL

## 2024-10-14 DIAGNOSIS — L40.9 PSORIASIS, UNSPECIFIED: ICD-10-CM

## 2024-10-14 DIAGNOSIS — L30.9 DERMATITIS, UNSPECIFIED: ICD-10-CM

## 2024-10-14 DIAGNOSIS — Z79.899 OTHER LONG TERM (CURRENT) DRUG THERAPY: ICD-10-CM

## 2024-10-14 DIAGNOSIS — Z22.7 LATENT TUBERCULOSIS: ICD-10-CM

## 2024-10-14 PROCEDURE — 99214 OFFICE O/P EST MOD 30 MIN: CPT

## 2024-10-14 RX ORDER — CLOBETASOL PROPIONATE 0.5 MG/G
0.05 CREAM TOPICAL
Qty: 1 | Refills: 2 | Status: ACTIVE | COMMUNITY
Start: 2024-10-14 | End: 1900-01-01

## 2024-10-15 NOTE — HISTORY OF PRESENT ILLNESS
[FreeTextEntry1] : f/u - psoriasis [de-identified] : 60 y/o M here for psoriasis f/u, last seen 6/14/24 [facility]  [id number] - Has failed Ilumya and methotrexate - Taltz injected in office 11/18/2023 and 12/4/2023; then stopped as he had a positive TB test--referred to ID who cleared him to restart taltz (he had LTBI 2015 and received 9 mos INH therapy afterward) - Pt reports itchy rash on hands and feet for the past 1 month - started 4-5 days after Sept dose - reports blistering with fluid, which is different than his normal psoriasis - skipped Oct dose as a result  - Reports he just used lotion (unsure of name) for hands and feet - Otherwise reports pso just active on L elbow  - Denies excess fatigue, eye sxs, GI distress, joint pain, or stiffness - Restarted Taltz 80mg sc monthly on 6/4, no issues with injection

## 2024-10-15 NOTE — ASSESSMENT
[FreeTextEntry1] : #Psoriasis, moderate, chronic, stable - He has failed MTX, Ilumya - Prefers to avoid topicals - Restarted Taltz 6/4  - C/w Taltz 80mg sc every 4 weeks. Reviewed Taltz AEs including worsening of colitis, infection, fungal infections. If recalcitrant, can consider increasing frequency to every 2 weeks.  #Dermatitis, hands, feet, new diagnosis of uncertain prognosis Ddx psoriasis, dyshidrotic eczema - START clobetasol 0.05% cream twice daily to affected areas of feet under occlusion - Proper topical steroid use and side effects discussed, including cutaneous atrophy, telangiectasias, and striae. Avoid long-term use. - Consider biopsy at next visit if not improving   #High risk med use - Taltz #Hx of latent TB - S/p 9 mos of INH in 2015 - Reviewed CBC, CMP, hep serologies, quant 12/2023--quant will remain positive given his hx of TB; cleared by ID    RTC 2 months

## 2024-10-15 NOTE — PHYSICAL EXAM
[Alert] : alert [Oriented x 3] : ~L oriented x 3 [Declined] : declined [FreeTextEntry3] : Focused skin exam performed. The relevant portions of the exam were performed today. AAOx3, NAD, well-appearing / pleasant. Focused examination within normal limits with the exception of: - Well-demarcated pink scaly pink plaques with collarettes of scale on hands and feet  - Well-demarcated pink scaly plaque on L elbow

## 2024-10-15 NOTE — HISTORY OF PRESENT ILLNESS
[FreeTextEntry1] : f/u - psoriasis [de-identified] : 58 y/o M here for psoriasis f/u, last seen 6/14/24 [facility]  [id number] - Has failed Ilumya and methotrexate - Taltz injected in office 11/18/2023 and 12/4/2023; then stopped as he had a positive TB test--referred to ID who cleared him to restart taltz (he had LTBI 2015 and received 9 mos INH therapy afterward) - Pt reports itchy rash on hands and feet for the past 1 month - started 4-5 days after Sept dose - reports blistering with fluid, which is different than his normal psoriasis - skipped Oct dose as a result  - Reports he just used lotion (unsure of name) for hands and feet - Otherwise reports pso just active on L elbow  - Denies excess fatigue, eye sxs, GI distress, joint pain, or stiffness - Restarted Taltz 80mg sc monthly on 6/4, no issues with injection

## 2024-11-04 ENCOUNTER — NON-APPOINTMENT (OUTPATIENT)
Age: 59
End: 2024-11-04

## 2024-11-04 ENCOUNTER — APPOINTMENT (OUTPATIENT)
Dept: DERMATOLOGY | Facility: CLINIC | Age: 59
End: 2024-11-04
Payer: COMMERCIAL

## 2024-11-04 VITALS — WEIGHT: 162 LBS | BODY MASS INDEX: 25.37 KG/M2

## 2024-11-04 DIAGNOSIS — Z79.899 OTHER LONG TERM (CURRENT) DRUG THERAPY: ICD-10-CM

## 2024-11-04 DIAGNOSIS — L40.9 PSORIASIS, UNSPECIFIED: ICD-10-CM

## 2024-11-04 PROCEDURE — 99214 OFFICE O/P EST MOD 30 MIN: CPT

## 2024-11-04 NOTE — HISTORY OF PRESENT ILLNESS
[FreeTextEntry1] : f/u - psoriasis [de-identified] : 58 y/o M here for psoriasis f/u, last seen 10/14/24--was supposed to come back in 2 mos, misunderstood and thought 2-3 weeks [facility]  [id number] - Has failed Ilumya and methotrexate - Taltz injected in office 11/18/2023 and 12/4/2023; then stopped as he had a positive TB test--referred to ID who cleared him to restart taltz (he had LTBI 2015 and received 9 mos INH therapy afterward) - Restarted Taltz 80mg sc monthly on 6/4, no issues with injection - At [facility] pt reported itchy rash on hands and feet for the past 1 month - started 4-5 days after Sept dose - reported blistering with fluid, which is different than his normal psoriasis - skipped Oct dose as a result --this has now cleared on its own (rxed clobetasol [facility] but he did not receive this) - Endorses intermittent mild joint pain in the ankles, wrists, hands only, in the evenings-started 2 mos ago

## 2024-11-04 NOTE — PHYSICAL EXAM
[Alert] : alert [Oriented x 3] : ~L oriented x 3 [Declined] : declined [FreeTextEntry3] : Focused skin exam performed. The relevant portions of the exam were performed today. AAOx3, NAD, well-appearing / pleasant. Focused examination within normal limits with the exception of: - xerosis of b/l hands and feet - thin pink scaly plaque in R hand 4th dorsal webspace - hyperpigmented patches scattered on lower legs/arms

## 2024-11-04 NOTE — HISTORY OF PRESENT ILLNESS
[FreeTextEntry1] : f/u - psoriasis [de-identified] : 60 y/o M here for psoriasis f/u, last seen 10/14/24--was supposed to come back in 2 mos, misunderstood and thought 2-3 weeks [facility]  [id number] - Has failed Ilumya and methotrexate - Taltz injected in office 11/18/2023 and 12/4/2023; then stopped as he had a positive TB test--referred to ID who cleared him to restart taltz (he had LTBI 2015 and received 9 mos INH therapy afterward) - Restarted Taltz 80mg sc monthly on 6/4, no issues with injection - At [facility] pt reported itchy rash on hands and feet for the past 1 month - started 4-5 days after Sept dose - reported blistering with fluid, which is different than his normal psoriasis - skipped Oct dose as a result --this has now cleared on its own (rxed clobetasol [facility] but he did not receive this) - Endorses intermittent mild joint pain in the ankles, wrists, hands only, in the evenings-started 2 mos ago

## 2024-11-04 NOTE — ASSESSMENT
[FreeTextEntry1] : #Psoriasis, moderate, chronic, stable - He has failed MTX, Ilumya - Prefers to avoid topicals - Restarted Taltz 6/4 - C/w Taltz 80mg sc every 4 weeks. Reviewed Taltz AEs including worsening of colitis, infection, fungal infections. If recalcitrant, can consider increasing frequency to every 2 weeks. - May use clobetasol crm to any active areas for up to 2w at a time, resent to patient's preferred pharmacy (explained that it does not go through [facility] like taltz)  #High risk med use - Taltz #Hx of latent TB - S/p 9 mos of INH in 2015 - Reviewed CBC, CMP, hep serologies, quant 12/2023--quant will remain positive given his hx of TB; cleared by ID    RTC 3 months

## 2025-02-04 ENCOUNTER — APPOINTMENT (OUTPATIENT)
Dept: DERMATOLOGY | Facility: CLINIC | Age: 60
End: 2025-02-04
Payer: COMMERCIAL

## 2025-02-04 DIAGNOSIS — L40.9 PSORIASIS, UNSPECIFIED: ICD-10-CM

## 2025-02-04 DIAGNOSIS — Z79.899 OTHER LONG TERM (CURRENT) DRUG THERAPY: ICD-10-CM

## 2025-02-04 DIAGNOSIS — Z22.7 LATENT TUBERCULOSIS: ICD-10-CM

## 2025-02-04 PROCEDURE — 99214 OFFICE O/P EST MOD 30 MIN: CPT

## 2025-02-04 NOTE — ASSESSMENT
[FreeTextEntry1] : #Psoriasis, moderate, chronic, stable - He has failed MTX, Ilumya - Prefers to avoid topicals - Restarted Taltz 6/4 - C/w Taltz 80mg sc every 4 weeks. Reviewed Taltz AEs including worsening of colitis, infection, fungal infections.  - May use clobetasol crm to any active areas for up to 2w at a time  #High risk med use - Taltz #Hx of latent TB - S/p 9 mos of INH in 2015 - Reviewed CBC, CMP, hep serologies, quant 12/2023--quant will remain positive given his hx of TB; cleared by ID (plan to monitor for any symptoms suggestive of active TB) - Check blood work today: CBC, CMP, hep serologies   RTC 6 months

## 2025-02-04 NOTE — PHYSICAL EXAM
[Alert] : alert [Oriented x 3] : ~L oriented x 3 [Declined] : declined [FreeTextEntry3] : Focused skin exam performed. The relevant portions of the exam were performed today. AAOx3, NAD, well-appearing / pleasant. Focused examination within normal limits with the exception of: - xerosis of b/l hands and feet - hyperpigmented patches scattered on lower legs/arms

## 2025-02-04 NOTE — HISTORY OF PRESENT ILLNESS
[FreeTextEntry1] : f/u - psoriasis [de-identified] : 58 y/o M here for psoriasis f/u  [facility]  Lana [id number] - Doing well today, no active areas. Not using topicals. No joint pains - Tolerating Taltz, no adverse events  Hx:  - Has failed Ilumya and methotrexate - Taltz injected in office 11/18/2023 and 12/4/2023; then stopped as he had a positive TB test--referred to ID who cleared him to restart taltz (he had LTBI 2015 and received 9 mos INH therapy afterward) - Restarted Taltz 80mg sc monthly on 6/4, no issues with injection

## 2025-02-05 LAB
ALBUMIN SERPL ELPH-MCNC: 4.6 G/DL
ALP BLD-CCNC: 79 U/L
ALT SERPL-CCNC: 34 U/L
ANION GAP SERPL CALC-SCNC: 11 MMOL/L
AST SERPL-CCNC: 19 U/L
BILIRUB SERPL-MCNC: 0.5 MG/DL
BUN SERPL-MCNC: 27 MG/DL
CALCIUM SERPL-MCNC: 9.7 MG/DL
CHLORIDE SERPL-SCNC: 102 MMOL/L
CO2 SERPL-SCNC: 27 MMOL/L
CREAT SERPL-MCNC: 1.37 MG/DL
EGFR: 59 ML/MIN/1.73M2
GLUCOSE SERPL-MCNC: 115 MG/DL
HBV CORE IGG+IGM SER QL: NONREACTIVE
HBV SURFACE AB SER QL: NONREACTIVE
HBV SURFACE AG SER QL: NONREACTIVE
HCT VFR BLD CALC: 54.2 %
HCV AB SER QL: NONREACTIVE
HCV S/CO RATIO: 0.08 S/CO
HGB BLD-MCNC: 17.6 G/DL
MCHC RBC-ENTMCNC: 30.7 PG
MCHC RBC-ENTMCNC: 32.5 G/DL
MCV RBC AUTO: 94.4 FL
PLATELET # BLD AUTO: 297 K/UL
POTASSIUM SERPL-SCNC: 4 MMOL/L
PROT SERPL-MCNC: 7.9 G/DL
RBC # BLD: 5.74 M/UL
RBC # FLD: 12.8 %
SODIUM SERPL-SCNC: 140 MMOL/L
WBC # FLD AUTO: 7.46 K/UL

## 2025-03-26 NOTE — H&P ADULT - BMI (KG/M2)
"25.1

## 2025-07-25 ENCOUNTER — APPOINTMENT (OUTPATIENT)
Dept: DERMATOLOGY | Facility: CLINIC | Age: 60
End: 2025-07-25
Payer: COMMERCIAL

## 2025-07-25 DIAGNOSIS — L40.9 PSORIASIS, UNSPECIFIED: ICD-10-CM

## 2025-07-25 DIAGNOSIS — Z79.899 OTHER LONG TERM (CURRENT) DRUG THERAPY: ICD-10-CM

## 2025-07-25 DIAGNOSIS — Z22.7 LATENT TUBERCULOSIS: ICD-10-CM

## 2025-07-25 PROCEDURE — 99204 OFFICE O/P NEW MOD 45 MIN: CPT

## 2025-07-25 NOTE — HISTORY OF PRESENT ILLNESS
[FreeTextEntry1] : f/u - psoriasis [de-identified] : 58 y/o M here for psoriasis f/u  - Doing well today on Taltz. No active areas. Not using topicals. No joint pains. Ran out of refills from pharmacy and has missed one month.  Hx:  - Has failed Ilumya and methotrexate - Taltz injected in office 11/18/2023 and 12/4/2023; then stopped as he had a positive TB test--referred to ID who cleared him to restart taltz (he had LTBI 2015 and received 9 mos INH therapy afterward) - Restarted Taltz 80mg sc monthly on 6/4, no issues with injection

## 2025-07-25 NOTE — PHYSICAL EXAM
[Alert] : alert [Oriented x 3] : ~L oriented x 3 [Declined] : declined [FreeTextEntry3] : Focused skin exam performed  The relevant portions of the exam were performed today  AAOx3, NAD, well-appearing / pleasant Focused examination within normal limits with the exception of: - hyperpigmented patches scattered on lower legs/arms

## 2025-07-25 NOTE — ASSESSMENT
[FreeTextEntry1] : #Psoriasis, moderate, chronic, stable - He has failed MTX, Ilumya - Prefers to avoid topicals - Restarted Taltz 6/4 - C/w Taltz 80mg sc every 4 weeks. Reviewed Taltz AEs including worsening of colitis, infection, fungal infections.   Physician sample provided: Lot [id number]    Exp 7/10/2026 Patient deferred in-office injection today- he will go home and inject himself. - May use clobetasol crm to any active areas for up to 2w at a time  #High risk med use - Taltz #Hx of latent TB - S/p 9 mos of INH in 2015 - Feb 2025 CMP, CBC, hepatitis serologies stable. - Reviewed CBC, CMP, hep serologies, quant 12/2023--quant will remain positive given his hx of TB; cleared by ID (plan to monitor for any symptoms suggestive of active TB) - Will repeat CBC and CMP today- added on HIV screen.  RTC 6 months